# Patient Record
Sex: MALE | Race: WHITE | Employment: FULL TIME | URBAN - METROPOLITAN AREA
[De-identification: names, ages, dates, MRNs, and addresses within clinical notes are randomized per-mention and may not be internally consistent; named-entity substitution may affect disease eponyms.]

---

## 2018-05-26 ENCOUNTER — APPOINTMENT (OUTPATIENT)
Dept: CT IMAGING | Age: 38
End: 2018-05-26
Attending: EMERGENCY MEDICINE
Payer: COMMERCIAL

## 2018-05-26 ENCOUNTER — APPOINTMENT (OUTPATIENT)
Dept: GENERAL RADIOLOGY | Age: 38
End: 2018-05-26
Attending: EMERGENCY MEDICINE
Payer: COMMERCIAL

## 2018-05-26 ENCOUNTER — HOSPITAL ENCOUNTER (EMERGENCY)
Age: 38
Discharge: HOME | End: 2018-05-26
Attending: EMERGENCY MEDICINE
Payer: COMMERCIAL

## 2018-05-26 VITALS
HEIGHT: 74 IN | BODY MASS INDEX: 38.63 KG/M2 | TEMPERATURE: 98 F | RESPIRATION RATE: 18 BRPM | WEIGHT: 301 LBS | DIASTOLIC BLOOD PRESSURE: 78 MMHG | HEART RATE: 76 BPM | OXYGEN SATURATION: 94 % | SYSTOLIC BLOOD PRESSURE: 130 MMHG

## 2018-05-26 DIAGNOSIS — S20.212A CONTUSION OF RIB ON LEFT SIDE, INITIAL ENCOUNTER: Primary | ICD-10-CM

## 2018-05-26 PROCEDURE — 96376 TX/PRO/DX INJ SAME DRUG ADON: CPT

## 2018-05-26 PROCEDURE — 71101 X-RAY EXAM UNILAT RIBS/CHEST: CPT | Performed by: EMERGENCY MEDICINE

## 2018-05-26 PROCEDURE — 96374 THER/PROPH/DIAG INJ IV PUSH: CPT

## 2018-05-26 PROCEDURE — 85025 COMPLETE CBC W/AUTO DIFF WBC: CPT | Performed by: EMERGENCY MEDICINE

## 2018-05-26 PROCEDURE — 74177 CT ABD & PELVIS W/CONTRAST: CPT | Performed by: EMERGENCY MEDICINE

## 2018-05-26 PROCEDURE — 96375 TX/PRO/DX INJ NEW DRUG ADDON: CPT

## 2018-05-26 PROCEDURE — 99284 EMERGENCY DEPT VISIT MOD MDM: CPT

## 2018-05-26 RX ORDER — HYDROMORPHONE HYDROCHLORIDE 1 MG/ML
1 INJECTION, SOLUTION INTRAMUSCULAR; INTRAVENOUS; SUBCUTANEOUS EVERY 30 MIN PRN
Status: DISCONTINUED | OUTPATIENT
Start: 2018-05-26 | End: 2018-05-26

## 2018-05-26 RX ORDER — KETOROLAC TROMETHAMINE 30 MG/ML
15 INJECTION, SOLUTION INTRAMUSCULAR; INTRAVENOUS ONCE
Status: COMPLETED | OUTPATIENT
Start: 2018-05-26 | End: 2018-05-26

## 2018-05-26 RX ORDER — ONDANSETRON 2 MG/ML
4 INJECTION INTRAMUSCULAR; INTRAVENOUS ONCE
Status: COMPLETED | OUTPATIENT
Start: 2018-05-26 | End: 2018-05-26

## 2018-05-26 RX ORDER — HYDROCODONE BITARTRATE AND ACETAMINOPHEN 5; 325 MG/1; MG/1
1-2 TABLET ORAL EVERY 6 HOURS PRN
Qty: 10 TABLET | Refills: 0 | Status: SHIPPED | OUTPATIENT
Start: 2018-05-26 | End: 2018-05-29

## 2018-05-26 NOTE — ED PROVIDER NOTES
Patient Seen in: THE [facility] Emergency Department In [place]    History   Patient presents with:  Trauma (cardiovascular, musculoskeletal)    Stated Complaint: LEFT RIB CAGE PAIN- FELL    HPI    Patient is a 27-year-old male comes emergency room for evalua intact, sclerae white, conjunctiva is pink.   Oropharynx is unremarkable, no exudate, dentition intact, no facial bone tenderness or septal hematomas, TMs clear bilaterally  NECK: Supple, trachea midline, no lymphadenopathy, full ROM cervical spine without COMPARISON:  [place], XR CHEST PA + LAT CHEST (CPT=71020), 1/03/2016, 2:40. INDICATIONS:  LEFT RIB CAGE PAIN- FELL  PATIENT STATED HISTORY: (As transcribed by Technologist)  Patient was working out today when he was picked up and he fell to the ground. ABDOMINAL WALL:  Unremarkable. PELVIC ORGANS:  Unremarkable urinary bladder and prostate gland. LYMPH NODES:  No lymphadenopathy in the abdomen or pelvis. BONES:  Degenerative changes in the spine.   Spinal fusion hardware along the right8 side of the lower diagnosis)    Disposition:  Discharge  5/26/2018  1:00 pm    Follow-up:  Ynes White DO  [address] [address] [phone]      As needed        Medications Prescribed:  Discharge Medication List as of 5/26/2018  1:08 PM    STA

## 2018-12-30 ENCOUNTER — OFFICE VISIT (OUTPATIENT)
Dept: FAMILY MEDICINE CLINIC | Facility: CLINIC | Age: 38
End: 2018-12-30
Payer: COMMERCIAL

## 2018-12-30 VITALS
DIASTOLIC BLOOD PRESSURE: 92 MMHG | SYSTOLIC BLOOD PRESSURE: 132 MMHG | HEART RATE: 72 BPM | TEMPERATURE: 98 F | BODY MASS INDEX: 40.43 KG/M2 | HEIGHT: 74 IN | RESPIRATION RATE: 16 BRPM | WEIGHT: 315 LBS | OXYGEN SATURATION: 97 %

## 2018-12-30 DIAGNOSIS — H69.83 ETD (EUSTACHIAN TUBE DYSFUNCTION), BILATERAL: ICD-10-CM

## 2018-12-30 DIAGNOSIS — J06.9 VIRAL URI: Primary | ICD-10-CM

## 2018-12-30 PROCEDURE — 99213 OFFICE O/P EST LOW 20 MIN: CPT | Performed by: PHYSICIAN ASSISTANT

## 2018-12-30 RX ORDER — AMOXICILLIN 875 MG/1
875 TABLET, COATED ORAL 2 TIMES DAILY
Qty: 20 TABLET | Refills: 0 | Status: SHIPPED | OUTPATIENT
Start: 2018-12-30 | End: 2019-01-09

## 2018-12-30 RX ORDER — CITALOPRAM 20 MG/1
TABLET ORAL
Refills: 2 | COMMUNITY
Start: 2018-12-26 | End: 2020-09-25

## 2018-12-30 RX ORDER — FLUTICASONE PROPIONATE 50 MCG
2 SPRAY, SUSPENSION (ML) NASAL DAILY
Qty: 1 INHALER | Refills: 0 | Status: SHIPPED | OUTPATIENT
Start: 2018-12-30 | End: 2020-09-25

## 2018-12-30 RX ORDER — LAMOTRIGINE 100 MG/1
TABLET, EXTENDED RELEASE ORAL
Refills: 2 | COMMUNITY
Start: 2018-12-26 | End: 2021-01-25

## 2018-12-30 NOTE — PROGRESS NOTES
CHIEF COMPLAINT:   Patient presents with:  Ear Problem: both ear pain, jaw, sinus congestion, post nasal drip x3days      HPI:   Ygnacio Dubin is a 41year old male who presents for URI sxs for  3 days.   Patient reports nasal congestion, maxillary sinu LUNGS: denies shortness of breath or wheezing, See HPI  CARDIOVASCULAR: denies chest pain or palpitations   GI: denies N/V/C or abdominal pain  NEURO: + headaches    EXAM:   BP (!) 132/92 (BP Location: Right arm, Patient Position: Sitting, Cuff Size: large Meds & Refills for this Visit:  Requested Prescriptions     Signed Prescriptions Disp Refills   • amoxicillin 875 MG Oral Tab 20 tablet 0     Sig: Take 1 tablet (875 mg total) by mouth 2 (two) times daily for 10 days.    • Fluticasone Propionate 50 MCG/AC Because the middle ear fluid can become infected, it is important to watch for signs of an ear infection which may develop later. These signs include increased ear pain, fever, or drainage from the ear.   Home care  The following guidelines will help you ca The patient is asked to return if sx's persist or worsen.

## 2018-12-30 NOTE — PATIENT INSTRUCTIONS
-Sudafed  -Flonase    -Push fluids  -May start antibiotic in 2-3 days if symptoms are not improving. Earache, No Infection (Adult)  Earaches can happen without an infection.  This occurs when air and fluid build up behind the eardrum causing a feeling these medicines. Aspirin should never be used in anyone under 25years of age who is ill with a fever. It may cause severe liver damage. · You may use over-the-counter decongestants such as phenylephrine or pseudoephedrine.  But they are not always helpful

## 2019-02-24 ENCOUNTER — HOSPITAL ENCOUNTER (OUTPATIENT)
Age: 39
Discharge: HOME | End: 2019-02-24
Payer: COMMERCIAL

## 2019-02-24 VITALS
RESPIRATION RATE: 16 BRPM | BODY MASS INDEX: 40.43 KG/M2 | WEIGHT: 315 LBS | SYSTOLIC BLOOD PRESSURE: 134 MMHG | OXYGEN SATURATION: 96 % | HEIGHT: 74 IN | TEMPERATURE: 98 F | DIASTOLIC BLOOD PRESSURE: 90 MMHG | HEART RATE: 79 BPM

## 2019-02-24 DIAGNOSIS — J06.9 UPPER RESPIRATORY TRACT INFECTION, UNSPECIFIED TYPE: Primary | ICD-10-CM

## 2019-02-24 PROCEDURE — 99214 OFFICE O/P EST MOD 30 MIN: CPT

## 2019-02-24 PROCEDURE — 99213 OFFICE O/P EST LOW 20 MIN: CPT

## 2019-02-24 RX ORDER — PREDNISONE 20 MG/1
40 TABLET ORAL DAILY
Qty: 10 TABLET | Refills: 0 | Status: SHIPPED | OUTPATIENT
Start: 2019-02-24 | End: 2019-03-01

## 2019-02-24 RX ORDER — BENZONATATE 200 MG/1
200 CAPSULE ORAL 3 TIMES DAILY PRN
Qty: 15 CAPSULE | Refills: 0 | Status: SHIPPED | OUTPATIENT
Start: 2019-02-24 | End: 2019-03-01

## 2019-02-24 NOTE — ED INITIAL ASSESSMENT (HPI)
Cold and fever started 10 days ago. Fever for 4 days, but has resolved. Continued sinus congestion and worsening cold symptoms. Difficulty sleeping with cough that worsening. Cough is productive.

## 2019-02-24 NOTE — ED PROVIDER NOTES
[place] [address] [facility] [place]    History   Patient presents with:  Cough/URI  Fever (infectious)    Stated Complaint: cold-like symptoms    HPI    CHIEF COMPLAINT: Cough, congestion, runny nose    HISTORY OF PRESENT ILLNESS: Patient is HPI.  Constitutional and vital signs reviewed. All other systems reviewed and negative except as noted above.     Physical Exam     ED Triage Vitals [02/24/19 1110]   /90   Pulse 79   Resp 16   Temp 97.9 °F (36.6 °C)   Temp src Temporal   SpO2 96 complaints, or concerns. Patient states they understand diagnosis, followup plan and agree with and understand  discharge instructions and plan. I answered all of the patient's questions prior to discharge. Patient felt comfortable going home.       Dispo

## 2019-11-13 NOTE — ED AVS SNAPSHOT
David Chilel   MRN: [id number]    Department:  THE [facility] Emergency Department in [place]   Date of Visit:  5/26/2018           Disclosure     Insurance plans vary and the physician(s) referred by the ER may not be covered by your plan.  Please cont tell this physician (or your personal doctor if your instructions are to return to your personal doctor) about any new or lasting problems. The primary care or specialist physician will see patients referred from the [facility] Emergency Department.  Sarah Monroy Previous Accession (Optional): [id number] Previous Accession (Optional): [id number]

## 2020-01-06 ENCOUNTER — HOSPITAL ENCOUNTER (EMERGENCY)
Age: 40
Discharge: HOME | End: 2020-01-06
Attending: EMERGENCY MEDICINE
Payer: COMMERCIAL

## 2020-01-06 ENCOUNTER — APPOINTMENT (OUTPATIENT)
Dept: GENERAL RADIOLOGY | Age: 40
End: 2020-01-06
Attending: EMERGENCY MEDICINE
Payer: COMMERCIAL

## 2020-01-06 VITALS
TEMPERATURE: 98 F | SYSTOLIC BLOOD PRESSURE: 119 MMHG | OXYGEN SATURATION: 98 % | WEIGHT: 315 LBS | HEART RATE: 68 BPM | DIASTOLIC BLOOD PRESSURE: 74 MMHG | HEIGHT: 74 IN | BODY MASS INDEX: 40.43 KG/M2 | RESPIRATION RATE: 21 BRPM

## 2020-01-06 DIAGNOSIS — R07.89 CHEST PAIN, ATYPICAL: Primary | ICD-10-CM

## 2020-01-06 LAB
ALBUMIN SERPL-MCNC: 3.7 G/DL (ref 3.4–5)
ALBUMIN/GLOB SERPL: 0.9 {RATIO} (ref 1–2)
ALP LIVER SERPL-CCNC: 114 U/L (ref 45–117)
ALT SERPL-CCNC: 60 U/L (ref 16–61)
ANION GAP SERPL CALC-SCNC: 5 MMOL/L (ref 0–18)
APTT PPP: 28.9 SECONDS (ref 25.4–36.1)
AST SERPL-CCNC: 25 U/L (ref 15–37)
BASOPHILS # BLD AUTO: 0.03 X10(3) UL (ref 0–0.2)
BASOPHILS NFR BLD AUTO: 0.5 %
BILIRUB SERPL-MCNC: 0.4 MG/DL (ref 0.1–2)
BUN BLD-MCNC: 18 MG/DL (ref 7–18)
BUN/CREAT SERPL: 16.5 (ref 10–20)
CALCIUM BLD-MCNC: 8.7 MG/DL (ref 8.5–10.1)
CHLORIDE SERPL-SCNC: 105 MMOL/L (ref 98–112)
CO2 SERPL-SCNC: 29 MMOL/L (ref 21–32)
CREAT BLD-MCNC: 1.09 MG/DL (ref 0.7–1.3)
D-DIMER: 0.28 UG/ML FEU (ref ?–0.5)
DEPRECATED RDW RBC AUTO: 39.7 FL (ref 35.1–46.3)
EOSINOPHIL # BLD AUTO: 0.12 X10(3) UL (ref 0–0.7)
EOSINOPHIL NFR BLD AUTO: 2 %
ERYTHROCYTE [DISTWIDTH] IN BLOOD BY AUTOMATED COUNT: 13 % (ref 11–15)
GLOBULIN PLAS-MCNC: 3.9 G/DL (ref 2.8–4.4)
GLUCOSE BLD-MCNC: 108 MG/DL (ref 70–99)
HCT VFR BLD AUTO: 43.9 % (ref 39–53)
HGB BLD-MCNC: 14.8 G/DL (ref 13–17.5)
IMM GRANULOCYTES # BLD AUTO: 0.03 X10(3) UL (ref 0–1)
IMM GRANULOCYTES NFR BLD: 0.5 %
INR BLD: 0.97 (ref 0.9–1.1)
LYMPHOCYTES # BLD AUTO: 2.51 X10(3) UL (ref 1–4)
LYMPHOCYTES NFR BLD AUTO: 40.9 %
M PROTEIN MFR SERPL ELPH: 7.6 G/DL (ref 6.4–8.2)
MCH RBC QN AUTO: 28.4 PG (ref 26–34)
MCHC RBC AUTO-ENTMCNC: 33.7 G/DL (ref 31–37)
MCV RBC AUTO: 84.3 FL (ref 80–100)
MONOCYTES # BLD AUTO: 0.69 X10(3) UL (ref 0.1–1)
MONOCYTES NFR BLD AUTO: 11.2 %
NEUTROPHILS # BLD AUTO: 2.76 X10 (3) UL (ref 1.5–7.7)
NEUTROPHILS # BLD AUTO: 2.76 X10(3) UL (ref 1.5–7.7)
NEUTROPHILS NFR BLD AUTO: 44.9 %
OSMOLALITY SERPL CALC.SUM OF ELEC: 290 MOSM/KG (ref 275–295)
PLATELET # BLD AUTO: 239 10(3)UL (ref 150–450)
POTASSIUM SERPL-SCNC: 4.3 MMOL/L (ref 3.5–5.1)
PSA SERPL DL<=0.01 NG/ML-MCNC: 12.9 SECONDS (ref 12.2–14.4)
RBC # BLD AUTO: 5.21 X10(6)UL (ref 4.3–5.7)
SODIUM SERPL-SCNC: 139 MMOL/L (ref 136–145)
TROPONIN I SERPL-MCNC: <0.045 NG/ML (ref ?–0.04)
TROPONIN I SERPL-MCNC: <0.045 NG/ML (ref ?–0.04)
WBC # BLD AUTO: 6.1 X10(3) UL (ref 4–11)

## 2020-01-06 PROCEDURE — 85730 THROMBOPLASTIN TIME PARTIAL: CPT | Performed by: EMERGENCY MEDICINE

## 2020-01-06 PROCEDURE — 85610 PROTHROMBIN TIME: CPT | Performed by: EMERGENCY MEDICINE

## 2020-01-06 PROCEDURE — 93010 ELECTROCARDIOGRAM REPORT: CPT

## 2020-01-06 PROCEDURE — 84484 ASSAY OF TROPONIN QUANT: CPT | Performed by: EMERGENCY MEDICINE

## 2020-01-06 PROCEDURE — 71045 X-RAY EXAM CHEST 1 VIEW: CPT | Performed by: EMERGENCY MEDICINE

## 2020-01-06 PROCEDURE — 85025 COMPLETE CBC W/AUTO DIFF WBC: CPT | Performed by: EMERGENCY MEDICINE

## 2020-01-06 PROCEDURE — 36415 COLL VENOUS BLD VENIPUNCTURE: CPT

## 2020-01-06 PROCEDURE — 93005 ELECTROCARDIOGRAM TRACING: CPT

## 2020-01-06 PROCEDURE — 85379 FIBRIN DEGRADATION QUANT: CPT | Performed by: EMERGENCY MEDICINE

## 2020-01-06 PROCEDURE — 99284 EMERGENCY DEPT VISIT MOD MDM: CPT

## 2020-01-06 PROCEDURE — 99285 EMERGENCY DEPT VISIT HI MDM: CPT

## 2020-01-06 PROCEDURE — 80053 COMPREHEN METABOLIC PANEL: CPT | Performed by: EMERGENCY MEDICINE

## 2020-01-07 LAB
ATRIAL RATE: 71 BPM
P AXIS: 33 DEGREES
P-R INTERVAL: 182 MS
Q-T INTERVAL: 410 MS
QRS DURATION: 100 MS
QTC CALCULATION (BEZET): 445 MS
R AXIS: 10 DEGREES
T AXIS: 39 DEGREES
VENTRICULAR RATE: 71 BPM

## 2020-01-07 NOTE — ED INITIAL ASSESSMENT (HPI)
Pt presents to ER complaining of \"feeling off. \"  Pt reports sudden onset of dizziness, SOB and chest pain radiating into his jaw.

## 2020-01-07 NOTE — ED PROVIDER NOTES
Patient Seen in: THE [facility] Emergency Department In [place]      History   Patient presents with:  Chest Pain Angina    Stated Complaint: chest pain, dizziness, starting 30 min ago     HPI    The 32-year-old man was at home with his daughter, he was helpi rare    Drug use: No             Review of Systems    Positive for stated complaint: chest pain, dizziness, starting 30 min ago   Other systems are as noted in HPI. Constitutional and vital signs reviewed.       All other systems reviewed and negative exce Status                     ---------                               -----------         ------                     CBC W/ DIFFERENTIAL[[id number]]                              Final result                 Please view results for these tests on the individual he describes in fairly minute detail that it was thick and slime-like. He however has no signs of mucous plugging on his chest x-ray.   D-dimer is negative for acute clot, and while that does not completely rule out dissection, that with a negative d-dimer

## 2020-01-07 NOTE — ED INITIAL ASSESSMENT (HPI)
Right sided chest pain starting about 30 min ago - started sharp, then changed to heaviness.  Also c/o mid jaw pain

## 2020-09-08 ENCOUNTER — OFFICE VISIT (OUTPATIENT)
Dept: SURGERY | Facility: CLINIC | Age: 40
End: 2020-09-08
Payer: COMMERCIAL

## 2020-09-08 VITALS — DIASTOLIC BLOOD PRESSURE: 86 MMHG | HEART RATE: 70 BPM | TEMPERATURE: 98 F | SYSTOLIC BLOOD PRESSURE: 130 MMHG

## 2020-09-08 DIAGNOSIS — K64.8 INTERNAL HEMORRHOIDS WITH COMPLICATION: ICD-10-CM

## 2020-09-08 DIAGNOSIS — K62.5 RECTAL BLEEDING: Primary | ICD-10-CM

## 2020-09-08 PROCEDURE — 46600 DIAGNOSTIC ANOSCOPY SPX: CPT | Performed by: SURGERY

## 2020-09-08 PROCEDURE — 3079F DIAST BP 80-89 MM HG: CPT | Performed by: SURGERY

## 2020-09-08 PROCEDURE — 3075F SYST BP GE 130 - 139MM HG: CPT | Performed by: SURGERY

## 2020-09-08 PROCEDURE — 99243 OFF/OP CNSLTJ NEW/EST LOW 30: CPT | Performed by: SURGERY

## 2020-09-08 NOTE — H&P
New Patient Visit Note       Active Problems      1. Rectal bleeding    2.  Internal hemorrhoids with complication        Chief Complaint   Patient presents with:  Rectal Pain: pt c/o of rectal pain   Bleeding: pt c/o of rectal bleeding with bm, blood in to today.     Family History   Problem Relation Age of Onset   • Diabetes Paternal Grandfather      Social History    Socioeconomic History      Marital status:       Spouse name: Not on file      Number of children: Not on file      Years of education: swelling. Gastrointestinal: Positive for rectal pain. Negative for abdominal distention, abdominal pain, anal bleeding, blood in stool, constipation, diarrhea, nausea and vomiting.    Genitourinary: Negative for difficulty urinating, dysuria, frequency an normal. Judgment and thought content normal. Cognition and memory are normal.   Nursing note and vitals reviewed.           Assessment   Rectal bleeding  (primary encounter diagnosis)  Internal hemorrhoids with complication      Plan     · Patient has inter

## 2020-09-08 NOTE — PATIENT INSTRUCTIONS
Understanding Hemorrhoids  Hemorrhoid tissues are “cushions” of blood vessels that swell slightly during bowel movements. Too much pressure on the anal canal can make these tissues stay enlarged. They may become inflamed and cause symptoms.  This can happ · Internal hemorrhoids often happen in clusters around the wall of the anal canal. They are often painless. But they may prolapse (protrude out of the anus) due to straining or pressure from hard stool.  After the bowel movement is over, they may then reduc Hemorrhoid tissues are “cushions” of blood vessels that swell slightly during bowel movements. Too much pressure on the anal canal can make these tissues stay enlarged. They may become inflamed and cause symptoms.  This can happen both inside and outside th

## 2020-09-11 ENCOUNTER — OFFICE VISIT (OUTPATIENT)
Dept: SURGERY | Facility: CLINIC | Age: 40
End: 2020-09-11
Payer: COMMERCIAL

## 2020-09-11 VITALS
RESPIRATION RATE: 16 BRPM | WEIGHT: 315 LBS | BODY MASS INDEX: 41 KG/M2 | SYSTOLIC BLOOD PRESSURE: 129 MMHG | DIASTOLIC BLOOD PRESSURE: 92 MMHG | TEMPERATURE: 97 F | HEART RATE: 81 BPM

## 2020-09-11 DIAGNOSIS — K64.8 INTERNAL HEMORRHOIDS WITH COMPLICATION: Primary | ICD-10-CM

## 2020-09-11 PROCEDURE — 3074F SYST BP LT 130 MM HG: CPT | Performed by: SURGERY

## 2020-09-11 PROCEDURE — 3080F DIAST BP >= 90 MM HG: CPT | Performed by: SURGERY

## 2020-09-11 PROCEDURE — 46221 LIGATION OF HEMORRHOID(S): CPT | Performed by: SURGERY

## 2020-09-11 NOTE — PROGRESS NOTES
Follow Up Visit Note       Active Problems      1.  Internal hemorrhoids with complication          Chief Complaint   Patient presents with:  Hemorrhoid Bandinst banding        History of Present Illness    The patient presents for rubber band ligation 50 MCG/ACT Nasal Suspension 2 sprays by Each Nare route daily. 1 Inhaler 0   • Zolpidem Tartrate 10 MG Oral Tab Take 10 mg by mouth nightly as needed for Sleep.      • Amphetamine-Dextroamphet ER (ADDERALL XR) 20 MG Oral Capsule SR 24 Hr Take 20 mg by mouth Assessment   Internal hemorrhoids with complication  (primary encounter diagnosis)    Plan     · Successful treatment of internal hemorrhoid of the left lateral position by rubber band ligation and phenol injection.   ·   · The care plan is discussed

## 2020-09-11 NOTE — PROGRESS NOTES
Follow Up Visit Note       Active Problems      No diagnosis found. Chief Complaint   Patient presents with:  Hemorrhoid Bandinst banding        History of Present Illness        Allergies  Jj Sorenson has No Known Allergies.     Past Medical / Bubba Chirinos nightly as needed for Sleep., Disp: , Rfl:   •  Amphetamine-Dextroamphet ER (ADDERALL XR) 20 MG Oral Capsule SR 24 Hr, Take 20 mg by mouth every morning., Disp: , Rfl:   •  Propranolol HCl (INDERAL) 80 MG Oral Tab, Take 1 tablet by mouth 2 (two) times kevin

## 2020-09-25 ENCOUNTER — OFFICE VISIT (OUTPATIENT)
Dept: SURGERY | Facility: CLINIC | Age: 40
End: 2020-09-25
Payer: COMMERCIAL

## 2020-09-25 VITALS
SYSTOLIC BLOOD PRESSURE: 128 MMHG | HEART RATE: 76 BPM | WEIGHT: 315 LBS | BODY MASS INDEX: 40.43 KG/M2 | DIASTOLIC BLOOD PRESSURE: 94 MMHG | TEMPERATURE: 98 F | HEIGHT: 74 IN

## 2020-09-25 DIAGNOSIS — K64.8 INTERNAL HEMORRHOIDS WITH COMPLICATION: Primary | ICD-10-CM

## 2020-09-25 PROCEDURE — 3008F BODY MASS INDEX DOCD: CPT | Performed by: SURGERY

## 2020-09-25 PROCEDURE — 3080F DIAST BP >= 90 MM HG: CPT | Performed by: SURGERY

## 2020-09-25 PROCEDURE — 46221 LIGATION OF HEMORRHOID(S): CPT | Performed by: SURGERY

## 2020-09-25 PROCEDURE — 3074F SYST BP LT 130 MM HG: CPT | Performed by: SURGERY

## 2020-09-25 RX ORDER — ARIPIPRAZOLE 5 MG/1
5 TABLET ORAL DAILY
COMMUNITY
Start: 2020-09-16 | End: 2021-01-25

## 2020-09-25 RX ORDER — CLONAZEPAM 1 MG/1
1 TABLET ORAL NIGHTLY
Status: ON HOLD | COMMUNITY
Start: 2020-09-10 | End: 2021-01-26

## 2020-09-25 NOTE — PROGRESS NOTES
Follow Up Visit Note       Active Problems      No diagnosis found. Chief Complaint   Patient presents with:  Hemorrhoid Banding: Patient is here for 2nd hemorrhoid banding.         History of Present Illness        Allergies  Millie Luis has No Known Isai MG Oral Tab, Take 10 mg by mouth nightly as needed for Sleep., Disp: , Rfl:   •  Amphetamine-Dextroamphet ER (ADDERALL XR) 20 MG Oral Capsule SR 24 Hr, Take 20 mg by mouth every morning., Disp: , Rfl:   •  Propranolol HCl (INDERAL) 80 MG Oral Tab, Take 1 t

## 2020-09-28 NOTE — PROGRESS NOTES
Follow Up Visit Note       Active Problems      1.  Internal hemorrhoids with complication          Chief Complaint   Patient presents with:  Hemorrhoid Banding: Patient is here for 2nd hemorrhoid banding. c/o bleeding for 3 days after 1st bandind and pain LamoTRIgine  MG Oral Tablet 24 Hr TK 3 TS PO D  2   • Amphetamine-Dextroamphet ER (ADDERALL XR) 20 MG Oral Capsule SR 24 Hr Take 20 mg by mouth every morning. • Propranolol HCl (INDERAL) 80 MG Oral Tab Take 1 tablet by mouth 2 (two) times daily. · Successful treatment of internal hemorrhoid by rubber band ligation and phenol injection. ·   · The care plan is discussed with the patient who voiced understanding.   ·   · Dietary, activity, and exercise recommendations were discussed with the mariam

## 2020-10-09 ENCOUNTER — OFFICE VISIT (OUTPATIENT)
Dept: SURGERY | Facility: CLINIC | Age: 40
End: 2020-10-09
Payer: COMMERCIAL

## 2020-10-09 VITALS — TEMPERATURE: 97 F | HEART RATE: 87 BPM | SYSTOLIC BLOOD PRESSURE: 125 MMHG | DIASTOLIC BLOOD PRESSURE: 86 MMHG

## 2020-10-09 DIAGNOSIS — K64.8 INTERNAL HEMORRHOIDS WITH COMPLICATION: Primary | ICD-10-CM

## 2020-10-09 PROCEDURE — 3074F SYST BP LT 130 MM HG: CPT | Performed by: SURGERY

## 2020-10-09 PROCEDURE — 46221 LIGATION OF HEMORRHOID(S): CPT | Performed by: SURGERY

## 2020-10-09 PROCEDURE — 3079F DIAST BP 80-89 MM HG: CPT | Performed by: SURGERY

## 2020-10-09 NOTE — PROGRESS NOTES
Follow Up Visit Note       Active Problems      No diagnosis found. Chief Complaint   Patient presents with:  Hemorrhoid Banding: 3rd banding.  2nd went better than first, had bleeding throughout the day, c/o improved bleeding from bandings, has increa 20 mg by mouth every morning., Disp: , Rfl:   •  Propranolol HCl (INDERAL) 80 MG Oral Tab, Take 1 tablet by mouth 2 (two) times daily.  (Patient taking differently: Take 120 mg by mouth daily.  ), Disp: 60 tablet, Rfl: 2     Review of Systems  The Review of

## 2020-10-09 NOTE — PROGRESS NOTES
Follow Up Visit Note       Active Problems      1. Internal hemorrhoids with complication          Chief Complaint   Patient presents with:  Hemorrhoid Banding: 3rd banding.  2nd went better than first, had bleeding throughout the day, c/o improved bleeding nightly. • LamoTRIgine  MG Oral Tablet 24 Hr TK 3 TS PO D  2   • Amphetamine-Dextroamphet ER (ADDERALL XR) 20 MG Oral Capsule SR 24 Hr Take 20 mg by mouth every morning.      • Propranolol HCl (INDERAL) 80 MG Oral Tab Take 1 tablet by mouth 2 (two ligation and phenol injection. ·   · The care plan is discussed with the patient who voiced understanding. ·   · Dietary, activity, and exercise recommendations were discussed with the patient.   ·   · The patient is encouraged to avoid straining, continu

## 2020-10-30 ENCOUNTER — OFFICE VISIT (OUTPATIENT)
Dept: SURGERY | Facility: CLINIC | Age: 40
End: 2020-10-30
Payer: COMMERCIAL

## 2020-10-30 VITALS
RESPIRATION RATE: 16 BRPM | HEART RATE: 80 BPM | WEIGHT: 315 LBS | DIASTOLIC BLOOD PRESSURE: 88 MMHG | SYSTOLIC BLOOD PRESSURE: 132 MMHG | BODY MASS INDEX: 41 KG/M2 | TEMPERATURE: 97 F

## 2020-10-30 DIAGNOSIS — K64.8 INTERNAL HEMORRHOIDS WITH COMPLICATION: Primary | ICD-10-CM

## 2020-10-30 PROCEDURE — 3079F DIAST BP 80-89 MM HG: CPT | Performed by: SURGERY

## 2020-10-30 PROCEDURE — 99212 OFFICE O/P EST SF 10 MIN: CPT | Performed by: SURGERY

## 2020-10-30 PROCEDURE — 46600 DIAGNOSTIC ANOSCOPY SPX: CPT | Performed by: SURGERY

## 2020-10-30 PROCEDURE — 3075F SYST BP GE 130 - 139MM HG: CPT | Performed by: SURGERY

## 2020-10-30 RX ORDER — PROPRANOLOL HYDROCHLORIDE 80 MG/1
80 CAPSULE, EXTENDED RELEASE ORAL DAILY
COMMUNITY
Start: 2020-10-18 | End: 2022-01-05

## 2020-10-30 NOTE — PROGRESS NOTES
Follow Up Visit Note       Active Problems      1.  Internal hemorrhoids with complication          Chief Complaint   Patient presents with:  Hemorrhoid Bandinth banding        History of Present Illness    The patient returns for follow-up after comple social 3x per week, now more rare      Drug use: No    Other Topics      Concerns:        Caffeine Concern: No        Exercise: No       Current Outpatient Medications:   •  Propranolol HCl ER 80 MG Oral Capsule SR 24 Hr, , Disp: , Rfl:   •  ARIpiprazole 5 Genitourinary: Rectum:      No rectal mass, anal fissure, tenderness, external hemorrhoid, internal hemorrhoid or abnormal anal tone. Prostate is not enlarged and not tender.     Genitourinary Comments: No Prostate Nodule  Anal Sphincter Intact  No Prur

## 2021-01-18 ENCOUNTER — APPOINTMENT (OUTPATIENT)
Dept: GENERAL RADIOLOGY | Age: 41
End: 2021-01-18
Attending: NURSE PRACTITIONER
Payer: COMMERCIAL

## 2021-01-18 ENCOUNTER — HOSPITAL ENCOUNTER (OUTPATIENT)
Age: 41
Discharge: HOME | End: 2021-01-18
Attending: NURSE PRACTITIONER
Payer: COMMERCIAL

## 2021-01-18 VITALS
DIASTOLIC BLOOD PRESSURE: 90 MMHG | RESPIRATION RATE: 16 BRPM | WEIGHT: 315 LBS | BODY MASS INDEX: 42 KG/M2 | TEMPERATURE: 98 F | HEART RATE: 73 BPM | SYSTOLIC BLOOD PRESSURE: 131 MMHG | OXYGEN SATURATION: 96 %

## 2021-01-18 DIAGNOSIS — J98.01 BRONCHOSPASM: Primary | ICD-10-CM

## 2021-01-18 PROCEDURE — 71046 X-RAY EXAM CHEST 2 VIEWS: CPT | Performed by: NURSE PRACTITIONER

## 2021-01-18 PROCEDURE — 99213 OFFICE O/P EST LOW 20 MIN: CPT | Performed by: NURSE PRACTITIONER

## 2021-01-18 RX ORDER — BENZONATATE 100 MG/1
100 CAPSULE ORAL 3 TIMES DAILY PRN
Qty: 30 CAPSULE | Refills: 0 | Status: SHIPPED | OUTPATIENT
Start: 2021-01-18 | End: 2021-01-25

## 2021-01-18 RX ORDER — PREDNISONE 20 MG/1
40 TABLET ORAL DAILY
Qty: 10 TABLET | Refills: 0 | Status: SHIPPED | OUTPATIENT
Start: 2021-01-18 | End: 2021-01-23

## 2021-01-18 RX ORDER — ALBUTEROL SULFATE 90 UG/1
2 AEROSOL, METERED RESPIRATORY (INHALATION) EVERY 4 HOURS PRN
Qty: 1 INHALER | Refills: 0 | Status: SHIPPED | OUTPATIENT
Start: 2021-01-18 | End: 2021-02-17

## 2021-01-18 NOTE — ED PROVIDER NOTES
Patient Seen in: [facility] [address] [place] [address]      History   Patient presents with:  Difficulty Breathing  Cough/URI    Stated Complaint: shortness of breath, covid + 12/24    HPI/Subjective:   HPI  Patient is a 45-year-old male past medical history of anxiety 16   Temp 97.9 °F (36.6 °C)   Temp src Temporal   SpO2 96 %   O2 Device None (Room air)       Current:/90   Pulse 73   Temp 97.9 °F (36.6 °C) (Temporal)   Resp 16   Wt (!) 149.7 kg   SpO2 96%   BMI 42.37 kg/m²         Physical Exam  Vitals signs and cough and wheeze until he can catch his breath again. FINDINGS:  Normal heart size and pulmonary vascularity. Clear lungs.   No pleural effusion            CONCLUSION:  No abnormality demonstrated in the chest.    Dictated by (CST): Lilli Meza MD on

## 2021-01-18 NOTE — ED INITIAL ASSESSMENT (HPI)
12/24 Pt was diagnosed with COVID    Pt states he \"feels better\" but c/o OTOOLE, dry cough, upper chest pain    Denies fevers

## 2021-01-22 ENCOUNTER — APPOINTMENT (OUTPATIENT)
Dept: CT IMAGING | Facility: HOSPITAL | Age: 41
End: 2021-01-22
Attending: EMERGENCY MEDICINE
Payer: COMMERCIAL

## 2021-01-22 ENCOUNTER — HOSPITAL ENCOUNTER (EMERGENCY)
Facility: HOSPITAL | Age: 41
Discharge: HOME | End: 2021-01-22
Attending: EMERGENCY MEDICINE
Payer: COMMERCIAL

## 2021-01-22 VITALS
RESPIRATION RATE: 17 BRPM | OXYGEN SATURATION: 97 % | HEART RATE: 73 BPM | DIASTOLIC BLOOD PRESSURE: 99 MMHG | SYSTOLIC BLOOD PRESSURE: 158 MMHG | HEIGHT: 75 IN | WEIGHT: 315 LBS | BODY MASS INDEX: 39.17 KG/M2 | TEMPERATURE: 97 F

## 2021-01-22 DIAGNOSIS — J40 BRONCHITIS: Primary | ICD-10-CM

## 2021-01-22 LAB
ALBUMIN SERPL-MCNC: 3.6 G/DL (ref 3.4–5)
ALBUMIN/GLOB SERPL: 1 {RATIO} (ref 1–2)
ALP LIVER SERPL-CCNC: 109 U/L
ALT SERPL-CCNC: 41 U/L
ANION GAP SERPL CALC-SCNC: 3 MMOL/L (ref 0–18)
AST SERPL-CCNC: 13 U/L (ref 15–37)
BASOPHILS # BLD AUTO: 0.04 X10(3) UL (ref 0–0.2)
BASOPHILS NFR BLD AUTO: 0.6 %
BILIRUB SERPL-MCNC: 0.3 MG/DL (ref 0.1–2)
BUN BLD-MCNC: 20 MG/DL (ref 7–18)
BUN/CREAT SERPL: 21.7 (ref 10–20)
CALCIUM BLD-MCNC: 9 MG/DL (ref 8.5–10.1)
CHLORIDE SERPL-SCNC: 107 MMOL/L (ref 98–112)
CO2 SERPL-SCNC: 28 MMOL/L (ref 21–32)
CREAT BLD-MCNC: 0.92 MG/DL
DEPRECATED RDW RBC AUTO: 40.6 FL (ref 35.1–46.3)
EOSINOPHIL # BLD AUTO: 0.08 X10(3) UL (ref 0–0.7)
EOSINOPHIL NFR BLD AUTO: 1.2 %
ERYTHROCYTE [DISTWIDTH] IN BLOOD BY AUTOMATED COUNT: 13.6 % (ref 11–15)
GLOBULIN PLAS-MCNC: 3.6 G/DL (ref 2.8–4.4)
GLUCOSE BLD-MCNC: 84 MG/DL (ref 70–99)
HCT VFR BLD AUTO: 45.3 %
HGB BLD-MCNC: 15.2 G/DL
IMM GRANULOCYTES # BLD AUTO: 0.05 X10(3) UL (ref 0–1)
IMM GRANULOCYTES NFR BLD: 0.7 %
LYMPHOCYTES # BLD AUTO: 2.09 X10(3) UL (ref 1–4)
LYMPHOCYTES NFR BLD AUTO: 31.3 %
M PROTEIN MFR SERPL ELPH: 7.2 G/DL (ref 6.4–8.2)
MCH RBC QN AUTO: 27.8 PG (ref 26–34)
MCHC RBC AUTO-ENTMCNC: 33.6 G/DL (ref 31–37)
MCV RBC AUTO: 82.8 FL
MONOCYTES # BLD AUTO: 0.5 X10(3) UL (ref 0.1–1)
MONOCYTES NFR BLD AUTO: 7.5 %
NEUTROPHILS # BLD AUTO: 3.92 X10 (3) UL (ref 1.5–7.7)
NEUTROPHILS # BLD AUTO: 3.92 X10(3) UL (ref 1.5–7.7)
NEUTROPHILS NFR BLD AUTO: 58.7 %
OSMOLALITY SERPL CALC.SUM OF ELEC: 288 MOSM/KG (ref 275–295)
PLATELET # BLD AUTO: 230 10(3)UL (ref 150–450)
POTASSIUM SERPL-SCNC: 3.9 MMOL/L (ref 3.5–5.1)
RBC # BLD AUTO: 5.47 X10(6)UL
SODIUM SERPL-SCNC: 138 MMOL/L (ref 136–145)
TROPONIN I SERPL-MCNC: <0.045 NG/ML (ref ?–0.04)
WBC # BLD AUTO: 6.7 X10(3) UL (ref 4–11)

## 2021-01-22 PROCEDURE — 99284 EMERGENCY DEPT VISIT MOD MDM: CPT

## 2021-01-22 PROCEDURE — 99285 EMERGENCY DEPT VISIT HI MDM: CPT

## 2021-01-22 PROCEDURE — 36415 COLL VENOUS BLD VENIPUNCTURE: CPT

## 2021-01-22 PROCEDURE — 93005 ELECTROCARDIOGRAM TRACING: CPT

## 2021-01-22 PROCEDURE — 85025 COMPLETE CBC W/AUTO DIFF WBC: CPT | Performed by: EMERGENCY MEDICINE

## 2021-01-22 PROCEDURE — 80053 COMPREHEN METABOLIC PANEL: CPT | Performed by: EMERGENCY MEDICINE

## 2021-01-22 PROCEDURE — 93010 ELECTROCARDIOGRAM REPORT: CPT

## 2021-01-22 PROCEDURE — 84484 ASSAY OF TROPONIN QUANT: CPT | Performed by: EMERGENCY MEDICINE

## 2021-01-22 PROCEDURE — 71275 CT ANGIOGRAPHY CHEST: CPT | Performed by: EMERGENCY MEDICINE

## 2021-01-22 NOTE — ED INITIAL ASSESSMENT (HPI)
Patient to ED with c/o coughing fits for one month and SOB. Patient had COVID in December. Patient to ED to r/o PE.

## 2021-01-23 LAB
ATRIAL RATE: 73 BPM
P AXIS: 23 DEGREES
P-R INTERVAL: 172 MS
Q-T INTERVAL: 402 MS
QRS DURATION: 92 MS
QTC CALCULATION (BEZET): 442 MS
R AXIS: 8 DEGREES
T AXIS: 22 DEGREES
VENTRICULAR RATE: 73 BPM

## 2021-01-23 NOTE — ED PROVIDER NOTES
Patient Seen in: [facility] Emergency Department      History   Patient presents with:  Difficulty Breathing    Stated Complaint: RICH, COVID + one month ago, 100% on room air    HPI/Subjective:   HPI    This is a very pleasant 28-year-old male who r Temp 97.2 °F (36.2 °C)   Temp src Temporal   SpO2 97 %   O2 Device None (Room air)       Current:BP (!) 158/99   Pulse 73   Temp 97.2 °F (36.2 °C) (Temporal)   Resp 17   Ht 190.5 cm (6' 3\")   Wt (!) 149.7 kg   SpO2 97%   BMI 41.25 kg/m²         Physical INDICATIONS:  shortness of breath, covid + 12/24  COMPARISON:  None. TECHNIQUE:  PA and lateral chest radiographs were obtained. PATIENT STATED HISTORY: (As transcribed by Technologist)  Patient states he was positive with Covid on 12.24/20.  Last week th of the upper abdomen are unremarkable. BONES:  No bony lesion or fracture. CONCLUSION:  No evidence of pulmonary embolism or acute cardiopulmonary process.    Dictated by (CST): Albert Hines MD on 1/22/2021 at 6:17 PM     Finalized by (CST): S

## 2021-01-25 ENCOUNTER — APPOINTMENT (OUTPATIENT)
Dept: GENERAL RADIOLOGY | Facility: HOSPITAL | Age: 41
DRG: 948 | End: 2021-01-25
Attending: EMERGENCY MEDICINE
Payer: COMMERCIAL

## 2021-01-25 ENCOUNTER — APPOINTMENT (OUTPATIENT)
Dept: CT IMAGING | Facility: HOSPITAL | Age: 41
DRG: 948 | End: 2021-01-25
Attending: EMERGENCY MEDICINE
Payer: COMMERCIAL

## 2021-01-25 ENCOUNTER — HOSPITAL ENCOUNTER (INPATIENT)
Facility: HOSPITAL | Age: 41
LOS: 1 days | Discharge: HOME | DRG: 948 | End: 2021-01-26
Attending: EMERGENCY MEDICINE | Admitting: HOSPITALIST
Payer: COMMERCIAL

## 2021-01-25 DIAGNOSIS — G25.79: Primary | ICD-10-CM

## 2021-01-25 PROBLEM — G90.81 SEROTONERGIC SYNDROME: Status: ACTIVE | Noted: 2021-01-25

## 2021-01-25 LAB
ALBUMIN SERPL-MCNC: 3.8 G/DL (ref 3.4–5)
ALBUMIN/GLOB SERPL: 0.9 {RATIO} (ref 1–2)
ALP LIVER SERPL-CCNC: 122 U/L
ALT SERPL-CCNC: 46 U/L
ANION GAP SERPL CALC-SCNC: 6 MMOL/L (ref 0–18)
AST SERPL-CCNC: 28 U/L (ref 15–37)
ATRIAL RATE: 75 BPM
BASOPHILS # BLD AUTO: 0.05 X10(3) UL (ref 0–0.2)
BASOPHILS NFR BLD AUTO: 0.7 %
BILIRUB SERPL-MCNC: 0.4 MG/DL (ref 0.1–2)
BILIRUB UR QL STRIP.AUTO: NEGATIVE
BUN BLD-MCNC: 11 MG/DL (ref 7–18)
BUN/CREAT SERPL: 12.6 (ref 10–20)
CALCIUM BLD-MCNC: 9.4 MG/DL (ref 8.5–10.1)
CHLORIDE SERPL-SCNC: 107 MMOL/L (ref 98–112)
CLARITY UR REFRACT.AUTO: CLEAR
CO2 SERPL-SCNC: 25 MMOL/L (ref 21–32)
COLOR UR AUTO: YELLOW
CREAT BLD-MCNC: 0.87 MG/DL
DEPRECATED RDW RBC AUTO: 40.7 FL (ref 35.1–46.3)
EOSINOPHIL # BLD AUTO: 0.11 X10(3) UL (ref 0–0.7)
EOSINOPHIL NFR BLD AUTO: 1.5 %
ERYTHROCYTE [DISTWIDTH] IN BLOOD BY AUTOMATED COUNT: 13.6 % (ref 11–15)
GLOBULIN PLAS-MCNC: 4.4 G/DL (ref 2.8–4.4)
GLUCOSE BLD-MCNC: 89 MG/DL (ref 70–99)
GLUCOSE UR STRIP.AUTO-MCNC: NEGATIVE MG/DL
HCT VFR BLD AUTO: 48.7 %
HGB BLD-MCNC: 16.5 G/DL
IMM GRANULOCYTES # BLD AUTO: 0.04 X10(3) UL (ref 0–1)
IMM GRANULOCYTES NFR BLD: 0.6 %
KETONES UR STRIP.AUTO-MCNC: NEGATIVE MG/DL
LEUKOCYTE ESTERASE UR QL STRIP.AUTO: NEGATIVE
LYMPHOCYTES # BLD AUTO: 2.31 X10(3) UL (ref 1–4)
LYMPHOCYTES NFR BLD AUTO: 32.4 %
M PROTEIN MFR SERPL ELPH: 8.2 G/DL (ref 6.4–8.2)
MCH RBC QN AUTO: 28.2 PG (ref 26–34)
MCHC RBC AUTO-ENTMCNC: 33.9 G/DL (ref 31–37)
MCV RBC AUTO: 83.2 FL
MONOCYTES # BLD AUTO: 0.67 X10(3) UL (ref 0.1–1)
MONOCYTES NFR BLD AUTO: 9.4 %
NEUTROPHILS # BLD AUTO: 3.95 X10 (3) UL (ref 1.5–7.7)
NEUTROPHILS # BLD AUTO: 3.95 X10(3) UL (ref 1.5–7.7)
NEUTROPHILS NFR BLD AUTO: 55.4 %
NITRITE UR QL STRIP.AUTO: NEGATIVE
OSMOLALITY SERPL CALC.SUM OF ELEC: 285 MOSM/KG (ref 275–295)
P AXIS: 27 DEGREES
P-R INTERVAL: 168 MS
PH UR STRIP.AUTO: 6 [PH] (ref 4.5–8)
PLATELET # BLD AUTO: 261 10(3)UL (ref 150–450)
POTASSIUM SERPL-SCNC: 4.1 MMOL/L (ref 3.5–5.1)
PROT UR STRIP.AUTO-MCNC: NEGATIVE MG/DL
Q-T INTERVAL: 400 MS
QRS DURATION: 94 MS
QTC CALCULATION (BEZET): 446 MS
R AXIS: 1 DEGREES
RBC # BLD AUTO: 5.85 X10(6)UL
RBC UR QL AUTO: NEGATIVE
SARS-COV-2 RNA RESP QL NAA+PROBE: NOT DETECTED
SODIUM SERPL-SCNC: 138 MMOL/L (ref 136–145)
SP GR UR STRIP.AUTO: 1.02 (ref 1–1.03)
T AXIS: 22 DEGREES
UROBILINOGEN UR STRIP.AUTO-MCNC: <2 MG/DL
VENTRICULAR RATE: 75 BPM
WBC # BLD AUTO: 7.1 X10(3) UL (ref 4–11)

## 2021-01-25 PROCEDURE — 71045 X-RAY EXAM CHEST 1 VIEW: CPT | Performed by: EMERGENCY MEDICINE

## 2021-01-25 PROCEDURE — 99223 1ST HOSP IP/OBS HIGH 75: CPT | Performed by: HOSPITALIST

## 2021-01-25 PROCEDURE — 70450 CT HEAD/BRAIN W/O DYE: CPT | Performed by: EMERGENCY MEDICINE

## 2021-01-25 RX ORDER — ENOXAPARIN SODIUM 100 MG/ML
0.5 INJECTION SUBCUTANEOUS NIGHTLY
Status: DISCONTINUED | OUTPATIENT
Start: 2021-01-25 | End: 2021-01-26

## 2021-01-25 RX ORDER — TRAZODONE HYDROCHLORIDE 50 MG/1
50 TABLET ORAL NIGHTLY
COMMUNITY
End: 2021-04-09 | Stop reason: ALTCHOICE

## 2021-01-25 RX ORDER — LAMOTRIGINE 100 MG/1
200 TABLET ORAL 2 TIMES DAILY
Status: DISCONTINUED | OUTPATIENT
Start: 2021-01-26 | End: 2021-01-26

## 2021-01-25 RX ORDER — METHOCARBAMOL 100 MG/ML
1 INJECTION, SOLUTION INTRAMUSCULAR; INTRAVENOUS ONCE
Status: COMPLETED | OUTPATIENT
Start: 2021-01-25 | End: 2021-01-25

## 2021-01-25 RX ORDER — KETOROLAC TROMETHAMINE 30 MG/ML
15 INJECTION, SOLUTION INTRAMUSCULAR; INTRAVENOUS ONCE
Status: COMPLETED | OUTPATIENT
Start: 2021-01-25 | End: 2021-01-25

## 2021-01-25 RX ORDER — LAMOTRIGINE 100 MG/1
100 TABLET, EXTENDED RELEASE ORAL DAILY
COMMUNITY

## 2021-01-25 RX ORDER — ONDANSETRON 2 MG/ML
4 INJECTION INTRAMUSCULAR; INTRAVENOUS ONCE
Status: COMPLETED | OUTPATIENT
Start: 2021-01-25 | End: 2021-01-25

## 2021-01-25 RX ORDER — METOPROLOL TARTRATE 5 MG/5ML
5 INJECTION INTRAVENOUS ONCE
Status: COMPLETED | OUTPATIENT
Start: 2021-01-25 | End: 2021-01-25

## 2021-01-25 RX ORDER — PROPRANOLOL HYDROCHLORIDE 20 MG/1
20 TABLET ORAL
Status: DISCONTINUED | OUTPATIENT
Start: 2021-01-26 | End: 2021-01-26

## 2021-01-25 RX ORDER — ONDANSETRON 2 MG/ML
4 INJECTION INTRAMUSCULAR; INTRAVENOUS EVERY 6 HOURS PRN
Status: DISCONTINUED | OUTPATIENT
Start: 2021-01-25 | End: 2021-01-26

## 2021-01-25 RX ORDER — LAMOTRIGINE 300 MG/1
300 TABLET, EXTENDED RELEASE ORAL DAILY
COMMUNITY
End: 2021-04-09

## 2021-01-25 RX ORDER — LORAZEPAM 2 MG/ML
1 INJECTION INTRAMUSCULAR ONCE
Status: COMPLETED | OUTPATIENT
Start: 2021-01-25 | End: 2021-01-25

## 2021-01-25 RX ORDER — SODIUM CHLORIDE, SODIUM LACTATE, POTASSIUM CHLORIDE, CALCIUM CHLORIDE 600; 310; 30; 20 MG/100ML; MG/100ML; MG/100ML; MG/100ML
INJECTION, SOLUTION INTRAVENOUS CONTINUOUS
Status: DISCONTINUED | OUTPATIENT
Start: 2021-01-25 | End: 2021-01-26

## 2021-01-25 RX ORDER — DESVENLAFAXINE 50 MG/1
50 TABLET, EXTENDED RELEASE ORAL DAILY
Status: ON HOLD | COMMUNITY
End: 2021-01-26

## 2021-01-25 RX ORDER — LORAZEPAM 2 MG/ML
2 INJECTION INTRAMUSCULAR EVERY 6 HOURS
Status: DISCONTINUED | OUTPATIENT
Start: 2021-01-25 | End: 2021-01-26

## 2021-01-25 RX ORDER — ACETAMINOPHEN 325 MG/1
650 TABLET ORAL EVERY 6 HOURS PRN
Status: DISCONTINUED | OUTPATIENT
Start: 2021-01-25 | End: 2021-01-26

## 2021-01-25 NOTE — ED PROVIDER NOTES
Patient Seen in: [facility] Emergency Department      History   Patient presents with:  Nausea/Vomiting/Diarrhea  Headache    Stated Complaint: nvd, headache, med reaction    HPI/Subjective:   HPI    Patient presents with possible medication reactio (Room air)       Current:BP (!) 146/96   Pulse 74   Temp 98.2 °F (36.8 °C) (Temporal)   Resp 14   Ht 185.4 cm (6' 1\")   Wt (!) 148.8 kg   SpO2 95%   BMI 43.27 kg/m²         Physical Exam    General: Alert and oriented x3, appears uncomfortable.   HEENT: No shortness of breath, covid + 12/24  COMPARISON:  None. TECHNIQUE:  PA and lateral chest radiographs were obtained. PATIENT STATED HISTORY: (As transcribed by Technologist)  Patient states he was positive with Covid on 12.24/20.  Last week the shortness of abdomen are unremarkable. BONES:  No bony lesion or fracture. CONCLUSION:  No evidence of pulmonary embolism or acute cardiopulmonary process.    Dictated by (CST): Julia Miller MD on 1/22/2021 at 6:17 PM     Finalized by (CST): Julia Miller, Robaxin. MDM      The patient's symptoms are suggestive of serotonin syndrome. His lab work-up and EKG are reassuring. He will be admitted to Dr. Abril Dubose from the hospitalist service for close observation and further treatment.   Patient was updated

## 2021-01-25 NOTE — H&P
[facility]PITAL[facility]  History and Physical     Reinaldo Lizama Patient Status:  Emergency    1980 MRN [id number]   Location [facility] Attending Shruthi Murphy MD   Hosp Day # 0 PCP Antonio Gipson DO     Chief Compl (four) hours as needed for Wheezing., Disp: 1 Inhaler, Rfl: 0    •  benzonatate 100 MG Oral Cap, Take 1 capsule (100 mg total) by mouth 3 (three) times daily as needed for cough. , Disp: 30 capsule, Rfl: 0    •  Propranolol HCl ER 80 MG Oral Capsule SR 24 H K 3.9 4.1    107   CO2 28.0 25.0   ALKPHO 109 122*   AST 13* 28   ALT 41 46   BILT 0.3 0.4   TP 7.2 8.2       Estimated Creatinine Clearance: 128.8 mL/min (based on SCr of 0.87 mg/dL). No results for input(s): PTP, INR in the last 168 hours.

## 2021-01-26 VITALS
DIASTOLIC BLOOD PRESSURE: 68 MMHG | RESPIRATION RATE: 20 BRPM | OXYGEN SATURATION: 94 % | HEART RATE: 78 BPM | TEMPERATURE: 98 F | BODY MASS INDEX: 39.17 KG/M2 | SYSTOLIC BLOOD PRESSURE: 117 MMHG | HEIGHT: 75 IN | WEIGHT: 315 LBS

## 2021-01-26 PROCEDURE — 99239 HOSP IP/OBS DSCHRG MGMT >30: CPT | Performed by: HOSPITALIST

## 2021-01-26 RX ORDER — TIZANIDINE 2 MG/1
2 TABLET ORAL EVERY 8 HOURS PRN
Qty: 20 TABLET | Refills: 0 | Status: SHIPPED | OUTPATIENT
Start: 2021-01-26 | End: 2021-01-26

## 2021-01-26 RX ORDER — TIZANIDINE 2 MG/1
2 TABLET ORAL EVERY 8 HOURS PRN
Status: DISCONTINUED | OUTPATIENT
Start: 2021-01-26 | End: 2021-01-26

## 2021-01-26 RX ORDER — LORAZEPAM 0.5 MG/1
1 TABLET ORAL EVERY 6 HOURS PRN
Qty: 15 TABLET | Refills: 0 | Status: SHIPPED | OUTPATIENT
Start: 2021-01-26 | End: 2021-04-16

## 2021-01-26 NOTE — PROGRESS NOTES
[facility]  Progress Note     Braulio Chau Patient Status:  Inpatient    1980 MRN [id number]   [facility] 3NE-A Attending Nuno Ríos MD   Hosp Day # 1 PCP Elia Valdez DO     Chief Complaint: N/v    S: Patient feel reviewed in Epic. Medications:   • Propranolol HCl  20 mg Oral QID Beta Blocker   • lamoTRIgine  200 mg Oral BID   • enoxaparin  0.5 mg/kg Subcutaneous Nightly   • LORazepam  2 mg Intravenous Q6H       ASSESSMENT / PLAN:     1.  Likely serotonin syndrome

## 2021-01-26 NOTE — PLAN OF CARE
Alert and oriented,V/S stable,IVF infusing,voids without problem. Plan of care discussed with patient,as well as safety precautions,verbalizes understanding. Call light kept within reach,isolation for r/o c-diff. Had diarrhea at home the day before admission.

## 2021-01-26 NOTE — DISCHARGE SUMMARY
[facility]  DISCHARGE SUMMARY     Geena Sheikh Patient Status:  Inpatient    1980 MRN [id number]   [facility] 3NE-A Attending Lenard Enriquez MD   [facility] Day # 1 PCP Kayli Jaquez DO     Date of Admission: 2021  Date Anxiety. Quantity: 15 tablet  Refills: 0        CONTINUE taking these medications      Instructions Prescription details   Adderall XR 20 MG Cp24  Generic drug: Amphetamine-Dextroamphet ER      Take 20 mg by mouth every morning.    Refills: 0     Albutero sounds.     -----------------------------------------------------------------------------------------------  PATIENT DISCHARGE INSTRUCTIONS: See electronic chart    Ashley Burt MD    Time spent:  > 30 minutes

## 2021-01-26 NOTE — PROGRESS NOTES
[facility] Note:  Anticoagulation Weight Dose Adjustment for enoxaparin (LOVENOX)    Cale Juarez is a 45year old patient who has been prescribed enoxaparin (LOVENOX) 40 mg subcutaneous every 24 hours.       Estimated Creatinine Clearance: 128.8 mL/

## 2021-01-26 NOTE — PLAN OF CARE
Assumed care at 0730. Pt a/ox4, VSS, on RA, NSR on telemetry. Pt with no c/o n/v/d, SOB, dizziness/lightheadedness. No BM, contact+ iso and CDIFF testing d/c'd by MD. Pt with pain to BL hands and feet, PRN zanaflex ordered, scheduled ativan.  IVF running LR

## 2021-01-26 NOTE — ED NOTES
Nurse to Nurse report called to Josselin Garcia and Mary Nunez RN, [id number]. Pt resting comfortably on cart VSS in NAD. Patient transport ordered.

## 2021-01-26 NOTE — PROGRESS NOTES
NURSING DISCHARGE NOTE    Discharged Home via Wheelchair. Accompanied by Family member  Belongings Taken by patient/family. Discharge navigator completed. Discharge instructions explained to pt, pt verbalized understanding. All questions answered.  Pt s

## 2021-01-26 NOTE — PROGRESS NOTES
NURSING ADMISSION NOTE      Patient admitted via CART  Oriented to room. Safety precautions initiated. Bed in low position. Call light in reach. Plan of care discussed with patient,including safety precautions and used of call light. Verbalizes unde

## 2021-01-26 NOTE — PAYOR COMM NOTE
--------------  ADMISSION REVIEW     Payor: JOSE ALMAZAN  Subscriber #:  [id number]  Authorization Number: [id number]    Admit date: 1/25/21  Admit time: 1902       Patient Seen in: [facility] Emergency Department    History   Patient presents with:  Na Methocarbamol (ROBAXIN) injection 1,000 mg (has no administration in time range)   ondansetron HCl (ZOFRAN) injection 4 mg (4 mg Intravenous Given 1/25/21 1454)   LORazepam (ATIVAN) injection 1 mg (1 mg Intravenous Given 1/25/21 1454)   sodium chloride 0 43.27 kg/m²     HEENT: Normocephalic atraumatic. Moist mucous membranes. EOM-I. PERRLA. Anicteric. Neck: No lymphadenopathy. No JVD. No carotid bruits. Respiratory: Clear to auscultation bilaterally. No wheezes. No rhonchi.   Cardiovascular: S1, S2. Regul Route     1/26/2021 1149 New Bag (none) Intravenous     1/26/2021 0438 New Bag (none) Intravenous     1/25/2021 2003 New Bag (none) Intravenous       lamoTRIgine (LAMICTAL) tab 200 mg     Date Action Dose Route     1/26/2021 0848 Given 200 mg Oral       LO

## 2021-01-28 NOTE — PAYOR COMM NOTE
Payor:  JOSE ALMAZAN  Subscriber #:  [id number]  Authorization Number: [id number]    Admit date: 1/25/21  Admit time:  1902  Discharge Date: 1/26/2021

## 2021-02-07 ENCOUNTER — HOSPITAL ENCOUNTER (EMERGENCY)
Facility: HOSPITAL | Age: 41
Discharge: HOME | End: 2021-02-08
Attending: EMERGENCY MEDICINE
Payer: COMMERCIAL

## 2021-02-07 DIAGNOSIS — I10 ESSENTIAL HYPERTENSION: ICD-10-CM

## 2021-02-07 DIAGNOSIS — K52.9 GASTROENTERITIS: Primary | ICD-10-CM

## 2021-02-07 LAB
ALBUMIN SERPL-MCNC: 3.9 G/DL (ref 3.4–5)
ALBUMIN/GLOB SERPL: 1 {RATIO} (ref 1–2)
ALP LIVER SERPL-CCNC: 126 U/L
ALT SERPL-CCNC: 42 U/L
ANION GAP SERPL CALC-SCNC: 3 MMOL/L (ref 0–18)
AST SERPL-CCNC: 16 U/L (ref 15–37)
BASOPHILS # BLD AUTO: 0.03 X10(3) UL (ref 0–0.2)
BASOPHILS NFR BLD AUTO: 0.5 %
BILIRUB SERPL-MCNC: 0.3 MG/DL (ref 0.1–2)
BUN BLD-MCNC: 15 MG/DL (ref 7–18)
BUN/CREAT SERPL: 16.3 (ref 10–20)
CALCIUM BLD-MCNC: 9.1 MG/DL (ref 8.5–10.1)
CHLORIDE SERPL-SCNC: 106 MMOL/L (ref 98–112)
CO2 SERPL-SCNC: 29 MMOL/L (ref 21–32)
CREAT BLD-MCNC: 0.92 MG/DL
DEPRECATED RDW RBC AUTO: 42.1 FL (ref 35.1–46.3)
EOSINOPHIL # BLD AUTO: 0.09 X10(3) UL (ref 0–0.7)
EOSINOPHIL NFR BLD AUTO: 1.5 %
ERYTHROCYTE [DISTWIDTH] IN BLOOD BY AUTOMATED COUNT: 13.7 % (ref 11–15)
GLOBULIN PLAS-MCNC: 3.9 G/DL (ref 2.8–4.4)
GLUCOSE BLD-MCNC: 88 MG/DL (ref 70–99)
HCT VFR BLD AUTO: 45 %
HGB BLD-MCNC: 14.9 G/DL
IMM GRANULOCYTES # BLD AUTO: 0.02 X10(3) UL (ref 0–1)
IMM GRANULOCYTES NFR BLD: 0.3 %
LYMPHOCYTES # BLD AUTO: 2.14 X10(3) UL (ref 1–4)
LYMPHOCYTES NFR BLD AUTO: 36.1 %
M PROTEIN MFR SERPL ELPH: 7.8 G/DL (ref 6.4–8.2)
MCH RBC QN AUTO: 28 PG (ref 26–34)
MCHC RBC AUTO-ENTMCNC: 33.1 G/DL (ref 31–37)
MCV RBC AUTO: 84.6 FL
MONOCYTES # BLD AUTO: 0.52 X10(3) UL (ref 0.1–1)
MONOCYTES NFR BLD AUTO: 8.8 %
NEUTROPHILS # BLD AUTO: 3.12 X10 (3) UL (ref 1.5–7.7)
NEUTROPHILS # BLD AUTO: 3.12 X10(3) UL (ref 1.5–7.7)
NEUTROPHILS NFR BLD AUTO: 52.8 %
OSMOLALITY SERPL CALC.SUM OF ELEC: 286 MOSM/KG (ref 275–295)
PLATELET # BLD AUTO: 243 10(3)UL (ref 150–450)
POTASSIUM SERPL-SCNC: 4.3 MMOL/L (ref 3.5–5.1)
RBC # BLD AUTO: 5.32 X10(6)UL
SODIUM SERPL-SCNC: 138 MMOL/L (ref 136–145)
WBC # BLD AUTO: 5.9 X10(3) UL (ref 4–11)

## 2021-02-07 PROCEDURE — 85025 COMPLETE CBC W/AUTO DIFF WBC: CPT | Performed by: EMERGENCY MEDICINE

## 2021-02-07 PROCEDURE — 80053 COMPREHEN METABOLIC PANEL: CPT | Performed by: EMERGENCY MEDICINE

## 2021-02-07 PROCEDURE — 96361 HYDRATE IV INFUSION ADD-ON: CPT

## 2021-02-07 PROCEDURE — 96374 THER/PROPH/DIAG INJ IV PUSH: CPT

## 2021-02-07 PROCEDURE — 99284 EMERGENCY DEPT VISIT MOD MDM: CPT

## 2021-02-07 RX ORDER — ONDANSETRON 2 MG/ML
4 INJECTION INTRAMUSCULAR; INTRAVENOUS ONCE
Status: COMPLETED | OUTPATIENT
Start: 2021-02-07 | End: 2021-02-07

## 2021-02-08 VITALS
RESPIRATION RATE: 19 BRPM | HEART RATE: 68 BPM | SYSTOLIC BLOOD PRESSURE: 147 MMHG | DIASTOLIC BLOOD PRESSURE: 100 MMHG | WEIGHT: 315 LBS | BODY MASS INDEX: 41 KG/M2 | OXYGEN SATURATION: 96 %

## 2021-02-08 RX ORDER — AMLODIPINE BESYLATE 5 MG/1
5 TABLET ORAL DAILY
Qty: 30 TABLET | Refills: 0 | Status: SHIPPED | OUTPATIENT
Start: 2021-02-08 | End: 2021-03-10

## 2021-02-08 RX ORDER — ONDANSETRON 4 MG/1
4 TABLET, ORALLY DISINTEGRATING ORAL EVERY 4 HOURS PRN
Qty: 10 TABLET | Refills: 0 | Status: SHIPPED | OUTPATIENT
Start: 2021-02-08 | End: 2021-02-15

## 2021-02-08 NOTE — ED PROVIDER NOTES
Patient Seen in: [facility] Emergency Department      History   Patient presents with:  Hypertension    Stated Complaint: HTN    HPI/Subjective:   HPI    52-year-old male presents emergency department states they are having vomiting nausea and diarr transmission during my interaction with the patient were taken. The patient was already wearing a droplet mask on my arrival to the room.  Personal protective equipment including droplet mask, eye protection, and gloves were worn throughout the duration of Patient was evaluated and blood pressure here was 146/86. We will give some IV fluids along with some Zofran and reassess. Check some baseline labs.          Patient was evaluated the emergency department feeling significantly better after some Zofran a

## 2021-04-09 ENCOUNTER — OFFICE VISIT (OUTPATIENT)
Dept: FAMILY MEDICINE CLINIC | Facility: CLINIC | Age: 41
End: 2021-04-09
Payer: COMMERCIAL

## 2021-04-09 VITALS
HEART RATE: 88 BPM | OXYGEN SATURATION: 98 % | DIASTOLIC BLOOD PRESSURE: 80 MMHG | RESPIRATION RATE: 18 BRPM | WEIGHT: 315 LBS | SYSTOLIC BLOOD PRESSURE: 120 MMHG | HEIGHT: 73.5 IN | BODY MASS INDEX: 40.86 KG/M2

## 2021-04-09 DIAGNOSIS — Z00.00 ROUTINE GENERAL MEDICAL EXAMINATION AT A HEALTH CARE FACILITY: Primary | ICD-10-CM

## 2021-04-09 DIAGNOSIS — E66.01 CLASS 3 SEVERE OBESITY DUE TO EXCESS CALORIES WITHOUT SERIOUS COMORBIDITY WITH BODY MASS INDEX (BMI) OF 40.0 TO 44.9 IN ADULT (HCC): ICD-10-CM

## 2021-04-09 PROBLEM — K64.8 INTERNAL HEMORRHOIDS WITH COMPLICATION: Status: RESOLVED | Noted: 2020-09-08 | Resolved: 2021-04-09

## 2021-04-09 PROBLEM — K62.5 RECTAL BLEEDING: Status: RESOLVED | Noted: 2020-09-08 | Resolved: 2021-04-09

## 2021-04-09 PROBLEM — E66.813 CLASS 3 SEVERE OBESITY WITH BODY MASS INDEX (BMI) OF 40.0 TO 44.9 IN ADULT: Status: ACTIVE | Noted: 2021-04-09

## 2021-04-09 PROBLEM — E66.813 CLASS 3 SEVERE OBESITY WITH BODY MASS INDEX (BMI) OF 40.0 TO 44.9 IN ADULT (HCC): Status: ACTIVE | Noted: 2021-04-09

## 2021-04-09 PROCEDURE — 3079F DIAST BP 80-89 MM HG: CPT | Performed by: FAMILY MEDICINE

## 2021-04-09 PROCEDURE — 3008F BODY MASS INDEX DOCD: CPT | Performed by: FAMILY MEDICINE

## 2021-04-09 PROCEDURE — 3074F SYST BP LT 130 MM HG: CPT | Performed by: FAMILY MEDICINE

## 2021-04-09 PROCEDURE — 99395 PREV VISIT EST AGE 18-39: CPT | Performed by: FAMILY MEDICINE

## 2021-04-09 RX ORDER — ZOLPIDEM TARTRATE 10 MG/1
10 TABLET ORAL NIGHTLY PRN
COMMUNITY
Start: 2021-03-18 | End: 2021-05-13 | Stop reason: ALTCHOICE

## 2021-04-09 NOTE — PATIENT INSTRUCTIONS
Fast 8 hours for labs. Water, black coffee, or plain tea only. Any sugar in the system will alter the glucose level and triglyceride level. Please call [phone] or use [employer] to schedule for laboratories or radiology procedures.     Due to the 2

## 2021-04-09 NOTE — PROGRESS NOTES
HPI:  Here for a physical.    PAST MEDICAL HISTORY:  Past Medical History:   Diagnosis Date   • Anxiety state, unspecified    • Back pain    • Depression    • Displacement of lumbar intervertebral disc without myelopathy 6/20/2013    Log Date: 08/06/2012 F Sexual activity: Not on file    Other Topics      Concerns:         Service: Not Asked        Blood Transfusions: Not Asked        Caffeine Concern: No        Occupational Exposure: Not Asked        Hobby Hazards: Not Asked        Sleep Concern: No constipation. He was told he may have irritable bowel syndrome. He manages this with his diet. Corwith Marion GENITOURINARY: No complaints of dysuria, urgency or frequency; no erectile dysfunction. MUSCULOSKELETAL: No complaints of arthralgias or myalgias.   NEURO: without lesions, no discharge. LYMPHATIC: no lymphadenopathy palpated about the anterior and posterior cervical chains, submandibular and supraclavicular areas, axilla, and inguinal areas.   EXTREMITIES / MUSCULOSKELETAL: 4 extremities with grossly normal

## 2021-04-16 ENCOUNTER — TELEMEDICINE (OUTPATIENT)
Dept: FAMILY MEDICINE CLINIC | Facility: CLINIC | Age: 41
End: 2021-04-16

## 2021-04-16 DIAGNOSIS — Z23 COVID-19 VACCINE ADMINISTERED: ICD-10-CM

## 2021-04-16 DIAGNOSIS — Z86.69 H/O MIGRAINE: ICD-10-CM

## 2021-04-16 DIAGNOSIS — G44.52 NEW DAILY PERSISTENT HEADACHE: Primary | ICD-10-CM

## 2021-04-16 PROCEDURE — 99214 OFFICE O/P EST MOD 30 MIN: CPT | Performed by: FAMILY MEDICINE

## 2021-04-16 RX ORDER — TRAMADOL HYDROCHLORIDE 50 MG/1
50 TABLET ORAL EVERY 6 HOURS PRN
Qty: 30 TABLET | Refills: 1 | Status: SHIPPED | OUTPATIENT
Start: 2021-04-16 | End: 2021-07-16

## 2021-04-16 NOTE — PROGRESS NOTES
Qian Dsouza-In    [facility] verbally consents to a Virtual/Telephone Check-In visit on 04/16/21. Patient understands and accepts financial responsibility for any deductible, co-insurance and/or co-pays associated with this service.     D 24 Hr Take 100 mg by mouth daily. • Propranolol HCl ER 80 MG Oral Capsule SR 24 Hr Take 80 mg by mouth daily. • Amphetamine-Dextroamphet ER (ADDERALL XR) 20 MG Oral Capsule SR 24 Hr Take 20 mg by mouth every morning.        ALLERGIES:   Patient ha restrictions of visitation. There are limitations of this visit as no physical exam could be performed. Every conscious effort was taken to allow for sufficient and adequate time.   This billing was spent on reviewing labs, medications, radiology tests an

## 2021-04-17 ENCOUNTER — LAB ENCOUNTER (OUTPATIENT)
Dept: LAB | Age: 41
End: 2021-04-17
Attending: FAMILY MEDICINE
Payer: COMMERCIAL

## 2021-04-17 ENCOUNTER — HOSPITAL ENCOUNTER (OUTPATIENT)
Dept: CT IMAGING | Age: 41
Discharge: HOME | End: 2021-04-17
Attending: FAMILY MEDICINE
Payer: COMMERCIAL

## 2021-04-17 DIAGNOSIS — E66.01 CLASS 3 SEVERE OBESITY DUE TO EXCESS CALORIES WITHOUT SERIOUS COMORBIDITY WITH BODY MASS INDEX (BMI) OF 40.0 TO 44.9 IN ADULT (HCC): ICD-10-CM

## 2021-04-17 DIAGNOSIS — Z23 COVID-19 VACCINE ADMINISTERED: ICD-10-CM

## 2021-04-17 DIAGNOSIS — Z00.00 ROUTINE GENERAL MEDICAL EXAMINATION AT A HEALTH CARE FACILITY: ICD-10-CM

## 2021-04-17 DIAGNOSIS — Z86.69 H/O MIGRAINE: ICD-10-CM

## 2021-04-17 DIAGNOSIS — G44.52 NEW DAILY PERSISTENT HEADACHE: ICD-10-CM

## 2021-04-17 PROCEDURE — 80053 COMPREHEN METABOLIC PANEL: CPT

## 2021-04-17 PROCEDURE — 36415 COLL VENOUS BLD VENIPUNCTURE: CPT

## 2021-04-17 PROCEDURE — 84443 ASSAY THYROID STIM HORMONE: CPT

## 2021-04-17 PROCEDURE — 80061 LIPID PANEL: CPT

## 2021-04-17 PROCEDURE — 83036 HEMOGLOBIN GLYCOSYLATED A1C: CPT

## 2021-04-17 PROCEDURE — 86337 INSULIN ANTIBODIES: CPT

## 2021-04-17 PROCEDURE — 70450 CT HEAD/BRAIN W/O DYE: CPT | Performed by: FAMILY MEDICINE

## 2021-05-12 ENCOUNTER — HOSPITAL ENCOUNTER (EMERGENCY)
Age: 41
Discharge: HOME | End: 2021-05-12
Attending: EMERGENCY MEDICINE
Payer: COMMERCIAL

## 2021-05-12 ENCOUNTER — APPOINTMENT (OUTPATIENT)
Dept: CT IMAGING | Age: 41
End: 2021-05-12
Attending: EMERGENCY MEDICINE
Payer: COMMERCIAL

## 2021-05-12 ENCOUNTER — APPOINTMENT (OUTPATIENT)
Dept: GENERAL RADIOLOGY | Age: 41
End: 2021-05-12
Attending: EMERGENCY MEDICINE
Payer: COMMERCIAL

## 2021-05-12 VITALS
TEMPERATURE: 98 F | SYSTOLIC BLOOD PRESSURE: 129 MMHG | HEART RATE: 79 BPM | BODY MASS INDEX: 40.43 KG/M2 | DIASTOLIC BLOOD PRESSURE: 89 MMHG | OXYGEN SATURATION: 97 % | HEIGHT: 74 IN | RESPIRATION RATE: 18 BRPM | WEIGHT: 315 LBS

## 2021-05-12 DIAGNOSIS — J20.9 ACUTE BRONCHITIS WITH BRONCHOSPASM: Primary | ICD-10-CM

## 2021-05-12 PROCEDURE — 83690 ASSAY OF LIPASE: CPT | Performed by: EMERGENCY MEDICINE

## 2021-05-12 PROCEDURE — 85610 PROTHROMBIN TIME: CPT | Performed by: EMERGENCY MEDICINE

## 2021-05-12 PROCEDURE — 71045 X-RAY EXAM CHEST 1 VIEW: CPT | Performed by: EMERGENCY MEDICINE

## 2021-05-12 PROCEDURE — 71260 CT THORAX DX C+: CPT | Performed by: EMERGENCY MEDICINE

## 2021-05-12 PROCEDURE — 99285 EMERGENCY DEPT VISIT HI MDM: CPT

## 2021-05-12 PROCEDURE — 80053 COMPREHEN METABOLIC PANEL: CPT | Performed by: EMERGENCY MEDICINE

## 2021-05-12 PROCEDURE — 82565 ASSAY OF CREATININE: CPT

## 2021-05-12 PROCEDURE — 84484 ASSAY OF TROPONIN QUANT: CPT | Performed by: EMERGENCY MEDICINE

## 2021-05-12 PROCEDURE — 96375 TX/PRO/DX INJ NEW DRUG ADDON: CPT

## 2021-05-12 PROCEDURE — 85379 FIBRIN DEGRADATION QUANT: CPT | Performed by: EMERGENCY MEDICINE

## 2021-05-12 PROCEDURE — 93005 ELECTROCARDIOGRAM TRACING: CPT

## 2021-05-12 PROCEDURE — 93010 ELECTROCARDIOGRAM REPORT: CPT

## 2021-05-12 PROCEDURE — 85025 COMPLETE CBC W/AUTO DIFF WBC: CPT | Performed by: EMERGENCY MEDICINE

## 2021-05-12 PROCEDURE — 96374 THER/PROPH/DIAG INJ IV PUSH: CPT

## 2021-05-12 PROCEDURE — 94640 AIRWAY INHALATION TREATMENT: CPT

## 2021-05-12 PROCEDURE — 85730 THROMBOPLASTIN TIME PARTIAL: CPT | Performed by: EMERGENCY MEDICINE

## 2021-05-12 RX ORDER — METHYLPREDNISOLONE SODIUM SUCCINATE 125 MG/2ML
125 INJECTION, POWDER, LYOPHILIZED, FOR SOLUTION INTRAMUSCULAR; INTRAVENOUS ONCE
Status: COMPLETED | OUTPATIENT
Start: 2021-05-12 | End: 2021-05-12

## 2021-05-12 RX ORDER — ALBUTEROL SULFATE 90 UG/1
8 AEROSOL, METERED RESPIRATORY (INHALATION) ONCE
Status: COMPLETED | OUTPATIENT
Start: 2021-05-12 | End: 2021-05-12

## 2021-05-12 RX ORDER — KETOROLAC TROMETHAMINE 15 MG/ML
15 INJECTION, SOLUTION INTRAMUSCULAR; INTRAVENOUS ONCE
Status: COMPLETED | OUTPATIENT
Start: 2021-05-12 | End: 2021-05-12

## 2021-05-12 RX ORDER — BENZONATATE 100 MG/1
100 CAPSULE ORAL 3 TIMES DAILY PRN
Qty: 30 CAPSULE | Refills: 0 | Status: SHIPPED | OUTPATIENT
Start: 2021-05-12 | End: 2021-06-09

## 2021-05-12 NOTE — ED PROVIDER NOTES
Patient Seen in: THE [facility] Emergency Department In [place]      History   Patient presents with:  Difficulty Breathing    Stated Complaint: america     HPI/Subjective:   HPI    45-year-old male presents emergency room with chief complaint of cough and shortn Review of Systems    Positive for stated complaint: america   Other systems are as noted in HPI. Constitutional and vital signs reviewed. All other systems reviewed and negative except as noted above.     Physical Exam     ED Triage Vitals [05/12/21 ---------                               -----------         ------                     CBC W/ DIFFERENTIAL[[id number]]          Abnormal            Final result                 Please view results for these tests on the individual orders.    RAINBOW DRAW B instructed to follow-up with primary care provider for further evaluation treatment, return immediately to ER for worsening, concerning, new, or changing/persisting symptoms.   I discussed the case with the patient and they had no questions, complaints, or

## 2021-05-12 NOTE — ED INITIAL ASSESSMENT (HPI)
Dx with pneumonia at immediate care last night-- increased sob today- advised to come here- did have Covid in Dec  Sob for about 3 wks

## 2021-05-13 ENCOUNTER — OFFICE VISIT (OUTPATIENT)
Dept: NEUROLOGY | Facility: CLINIC | Age: 41
End: 2021-05-13
Payer: COMMERCIAL

## 2021-05-13 VITALS — HEART RATE: 74 BPM | DIASTOLIC BLOOD PRESSURE: 78 MMHG | SYSTOLIC BLOOD PRESSURE: 122 MMHG | RESPIRATION RATE: 16 BRPM

## 2021-05-13 DIAGNOSIS — IMO0002 CHRONIC MIGRAINE: Primary | ICD-10-CM

## 2021-05-13 DIAGNOSIS — R41.89 COGNITIVE CHANGE: ICD-10-CM

## 2021-05-13 PROCEDURE — 3078F DIAST BP <80 MM HG: CPT | Performed by: OTHER

## 2021-05-13 PROCEDURE — 3074F SYST BP LT 130 MM HG: CPT | Performed by: OTHER

## 2021-05-13 PROCEDURE — 99244 OFF/OP CNSLTJ NEW/EST MOD 40: CPT | Performed by: OTHER

## 2021-05-13 RX ORDER — LAMOTRIGINE 300 MG/1
TABLET, EXTENDED RELEASE ORAL DAILY
COMMUNITY

## 2021-05-13 NOTE — PROGRESS NOTES
Patient here for evaluation of migraines. Has been also experiencing trouble with word finding, lost in familiar places and focusing.

## 2021-05-13 NOTE — PROGRESS NOTES
[facility] CONSULT[facility]    Date of consult: 5/13/2021    CC/Reason for consult: headache, memory loss  Consult Requested by Vaishnavi Cardoza MD    HPI: Soraida Lemons is a 37year old male with past medical history as listed below presents ANKLE FRACTURE SURGERY     • EXCIS LUMBAR DISK,ONE LEVEL      fusion L2-3   • REVISE ULNAR NERVE AT ELBOW  2009    ulnar release to both arms.     • SPINAL FUSION  2014     Social History:  Social History    Tobacco Use      Smoking status: Never Smoker this encounter. The patient indicates understanding of these issues and agrees with the plan.   RTC 4 weeks, may consider neuropsych test in the future   Pt should go ER for any new or worsening symptoms     Ruth Ann Smith MD   Neurology  [facility]

## 2021-05-24 ENCOUNTER — TELEPHONE (OUTPATIENT)
Dept: FAMILY MEDICINE CLINIC | Facility: CLINIC | Age: 41
End: 2021-05-24

## 2021-05-24 NOTE — TELEPHONE ENCOUNTER
Pt got the letter stating CT that was completed would need a peer to peer. Please see message below. Will you do peer to peer?          Good afternoon,     Denial was received for the urgent request of  CT BRAIN OR HEAD ([id number]).       Reason for denial cr

## 2021-05-24 NOTE — TELEPHONE ENCOUNTER
MRI Brain denial reason:  Advanced imaging for typical migraine or tension type headache is not indicated.     Peer to peer can be scheduled by calling [phone]  ID#[id number]

## 2021-05-24 NOTE — TELEPHONE ENCOUNTER
Pt calling states that his insurance wont cover CT scan of  Head/Brain due to need more information needed. Pt calling to ask if we can code it differently and give more information to get this covered. Please call back to advise.

## 2021-05-24 NOTE — TELEPHONE ENCOUNTER
Pt stated that Dr. Osmani Gutierrez wants him to have a sleep study done; last one was in 5/2013. He needs a new c pap machine, but needs new order. Also, pt hasn't recd call for auth of MRI. Please advise.

## 2021-05-24 NOTE — TELEPHONE ENCOUNTER
Had new onset headaches. This is an indication for CT. I see that the neurologist we referred him to has now ordered an MRI. So the neurologist must agree something is going on in the brain. 1st Trimester Sonogram/20 Week Level II Sonogram

## 2021-05-25 NOTE — TELEPHONE ENCOUNTER
MRI of brain ordered by neurology denied, defer to neurology?  (Dr. Genesis Milton was routed message with denial).

## 2021-05-25 NOTE — TELEPHONE ENCOUNTER
Yes please order sleep medicine consult and schedule P2P for me (anytime when I am in clinic is fine), thanks.

## 2021-05-26 NOTE — TELEPHONE ENCOUNTER
I called the [id number] line at 304 pm on May 26, 2021. Spoke to the intake person. Spoke to the nurse. Awaited the peer to peer conversation from the physician. At 3:23 PM I ended the phone call. This patient had a physical on April 9.   Had a Mod

## 2021-05-27 NOTE — TELEPHONE ENCOUNTER
Noted. Will you be able to call again? I'm sorry for the wait, but unfortunately, the insurance won't take this from an RN.      Let me know if there is specific time you want to do it; maybe we can call ahead of time and wait on hold and give call to you o

## 2021-06-09 ENCOUNTER — OFFICE VISIT (OUTPATIENT)
Dept: INTERNAL MEDICINE CLINIC | Facility: CLINIC | Age: 41
End: 2021-06-09
Payer: COMMERCIAL

## 2021-06-09 ENCOUNTER — LAB ENCOUNTER (OUTPATIENT)
Dept: LAB | Age: 41
End: 2021-06-09
Attending: INTERNAL MEDICINE
Payer: COMMERCIAL

## 2021-06-09 VITALS
SYSTOLIC BLOOD PRESSURE: 120 MMHG | WEIGHT: 315 LBS | BODY MASS INDEX: 40.43 KG/M2 | HEIGHT: 74 IN | HEART RATE: 66 BPM | DIASTOLIC BLOOD PRESSURE: 82 MMHG | OXYGEN SATURATION: 98 %

## 2021-06-09 DIAGNOSIS — R73.01 IFG (IMPAIRED FASTING GLUCOSE): ICD-10-CM

## 2021-06-09 DIAGNOSIS — Z01.818 PREOP EXAMINATION: ICD-10-CM

## 2021-06-09 DIAGNOSIS — Z51.81 ENCOUNTER FOR THERAPEUTIC DRUG MONITORING: Primary | ICD-10-CM

## 2021-06-09 DIAGNOSIS — E66.01 CLASS 3 SEVERE OBESITY WITH BODY MASS INDEX (BMI) OF 40.0 TO 44.9 IN ADULT, UNSPECIFIED OBESITY TYPE, UNSPECIFIED WHETHER SERIOUS COMORBIDITY PRESENT (HCC): ICD-10-CM

## 2021-06-09 DIAGNOSIS — F31.9 BIPOLAR DISEASE, CHRONIC (HCC): ICD-10-CM

## 2021-06-09 DIAGNOSIS — Z11.59 SCREENING FOR VIRAL DISEASE: ICD-10-CM

## 2021-06-09 DIAGNOSIS — G25.79: ICD-10-CM

## 2021-06-09 DIAGNOSIS — E78.2 MIXED HYPERLIPIDEMIA: ICD-10-CM

## 2021-06-09 DIAGNOSIS — M54.9 BACK PAIN, UNSPECIFIED BACK LOCATION, UNSPECIFIED BACK PAIN LATERALITY, UNSPECIFIED CHRONICITY: ICD-10-CM

## 2021-06-09 PROCEDURE — 3079F DIAST BP 80-89 MM HG: CPT | Performed by: PHYSICIAN ASSISTANT

## 2021-06-09 PROCEDURE — 99204 OFFICE O/P NEW MOD 45 MIN: CPT | Performed by: PHYSICIAN ASSISTANT

## 2021-06-09 PROCEDURE — 3008F BODY MASS INDEX DOCD: CPT | Performed by: PHYSICIAN ASSISTANT

## 2021-06-09 PROCEDURE — 3074F SYST BP LT 130 MM HG: CPT | Performed by: PHYSICIAN ASSISTANT

## 2021-06-09 NOTE — PROGRESS NOTES
HISTORY OF PRESENT ILLNESS  Patient presents with:  Weight Problem      Emil Hua is a 37year old male new to our office today for initiation of medical weight loss program.  Patient presents today with c/o excess weight.        Running is difficul disorder: negative   Migraines: Yes - propranolol  Seizures: negative   Joint-related conditions: L ankle pain, spinal fusion  Liver disease: negative   Renal disease: negative   Diabetes: negative  Thyroid disease: negative   Constipation: negative  [facility] Lab Results   Component Value Date     (H) 05/12/2021    BUN 17 05/12/2021    BUNCREA 16.5 05/12/2021    CREATSERUM 1.03 05/12/2021    ANIONGAP 5 05/12/2021    GFRNAA 94 05/12/2021    GFRAA 108 05/12/2021    CA 9.2 05/12/2021    OSMOCALC 287 05/12/2 unspecified obesity type, unspecified whether serious comorbidity present ([facility] [address]    IFG (impaired fasting glucose)    Mixed hyperlipidemia    Bipolar disease, chronic (HCC)    Serotonergic syndrome    Back pain, unspecified back location, unspecified back p vegetables/day  iii. Avoid skipping meals- eat every 4-5 hours  iv. Aim for 3 meals/day  2. Drink lots of water and cut down on soda/juice consumption if soda/juice drinker  3.  Focus on protein: (15-30 grams with each meal) ie. greek yogurt, cottage cheese eggs  -natural cheeses  -nuts (measure portion size)   -unsweetened nut butters  -dried edamame   -alysa seeds soaked in water or almond milk  -soy nuts  -cured meats (monitor for sodium issues)   -hummus with vegetables  -bean dip with vegetables     FRUIT

## 2021-06-09 NOTE — PATIENT INSTRUCTIONS
We are here to support you with weight loss, but please remember that you still need your primary care provider for your routine health maintenance.       PLAN:  Ozempic 0.25mg weekly  Go to the lab for blood work   Follow up with me in 1 month  Schedule fo ** Daily INPUT> Look at nutrition section-- \"nutrients\" and it will break down your macros for the day (ie. Protein, carbs, fibers, sugars and fats). Try to stay within these numbers daily     2.  \"7 minute workout\" to help with exercise/activity [facility]

## 2021-06-11 ENCOUNTER — HOSPITAL ENCOUNTER (OUTPATIENT)
Dept: MRI IMAGING | Age: 41
Discharge: HOME | End: 2021-06-11
Attending: Other
Payer: COMMERCIAL

## 2021-06-11 DIAGNOSIS — IMO0002 CHRONIC MIGRAINE: ICD-10-CM

## 2021-06-11 PROCEDURE — 70551 MRI BRAIN STEM W/O DYE: CPT | Performed by: OTHER

## 2021-06-12 ENCOUNTER — APPOINTMENT (OUTPATIENT)
Dept: SLEEP CENTER | Age: 41
End: 2021-06-12
Attending: Other
Payer: COMMERCIAL

## 2021-06-14 ENCOUNTER — ORDER TRANSCRIPTION (OUTPATIENT)
Dept: SLEEP CENTER | Age: 41
End: 2021-06-14

## 2021-06-14 DIAGNOSIS — Z01.818 PREOP EXAMINATION: Primary | ICD-10-CM

## 2021-06-14 DIAGNOSIS — Z11.59 SCREENING FOR VIRAL DISEASE: ICD-10-CM

## 2021-06-18 ENCOUNTER — NURSE ONLY (OUTPATIENT)
Dept: ELECTROPHYSIOLOGY | Facility: HOSPITAL | Age: 41
End: 2021-06-18
Attending: Other
Payer: COMMERCIAL

## 2021-06-18 DIAGNOSIS — R41.89 COGNITIVE CHANGE: ICD-10-CM

## 2021-06-18 DIAGNOSIS — IMO0002 CHRONIC MIGRAINE: ICD-10-CM

## 2021-06-18 PROCEDURE — 95816 EEG AWAKE AND DROWSY: CPT | Performed by: OTHER

## 2021-06-18 NOTE — PROCEDURES
Date of Procedure: 6/18/2021    Procedure: EEG (ELECTROENCEPHALOGRAM)     DX: HEADACHE, MEMORY LOSS  HX: 42 Y/O MALE WITH C/O HEADACHES AND MEMORY LOSS. HE HAD COVID LAST DECEMBER AND HEADACHES SEEM WORSE SINCE.     RX: LAMICTAL, BENZONATATE, TRAMADOL, PRO

## 2021-07-02 ENCOUNTER — LAB ENCOUNTER (OUTPATIENT)
Dept: LAB | Age: 41
End: 2021-07-02
Attending: Other
Payer: COMMERCIAL

## 2021-07-02 DIAGNOSIS — Z01.818 PREOP EXAMINATION: ICD-10-CM

## 2021-07-02 DIAGNOSIS — Z11.59 SCREENING FOR VIRAL DISEASE: ICD-10-CM

## 2021-07-03 LAB — SARS-COV-2 RNA RESP QL NAA+PROBE: NOT DETECTED

## 2021-07-05 ENCOUNTER — OFFICE VISIT (OUTPATIENT)
Dept: SLEEP CENTER | Age: 41
End: 2021-07-05
Attending: INTERNAL MEDICINE
Payer: COMMERCIAL

## 2021-07-05 DIAGNOSIS — R41.89 COGNITIVE CHANGE: ICD-10-CM

## 2021-07-05 PROCEDURE — 95811 POLYSOM 6/>YRS CPAP 4/> PARM: CPT

## 2021-07-16 ENCOUNTER — TELEPHONE (OUTPATIENT)
Dept: INTERNAL MEDICINE CLINIC | Facility: CLINIC | Age: 41
End: 2021-07-16

## 2021-07-16 ENCOUNTER — OFFICE VISIT (OUTPATIENT)
Dept: INTERNAL MEDICINE CLINIC | Facility: CLINIC | Age: 41
End: 2021-07-16
Payer: COMMERCIAL

## 2021-07-16 VITALS
WEIGHT: 315 LBS | DIASTOLIC BLOOD PRESSURE: 80 MMHG | HEIGHT: 74 IN | HEART RATE: 80 BPM | RESPIRATION RATE: 16 BRPM | SYSTOLIC BLOOD PRESSURE: 126 MMHG | BODY MASS INDEX: 40.43 KG/M2

## 2021-07-16 DIAGNOSIS — R73.01 IFG (IMPAIRED FASTING GLUCOSE): ICD-10-CM

## 2021-07-16 DIAGNOSIS — E78.2 MIXED HYPERLIPIDEMIA: ICD-10-CM

## 2021-07-16 DIAGNOSIS — G47.33 OSA (OBSTRUCTIVE SLEEP APNEA): ICD-10-CM

## 2021-07-16 DIAGNOSIS — E66.01 CLASS 3 SEVERE OBESITY WITH SERIOUS COMORBIDITY AND BODY MASS INDEX (BMI) OF 40.0 TO 44.9 IN ADULT, UNSPECIFIED OBESITY TYPE (HCC): ICD-10-CM

## 2021-07-16 DIAGNOSIS — Z51.81 ENCOUNTER FOR THERAPEUTIC DRUG MONITORING: Primary | ICD-10-CM

## 2021-07-16 DIAGNOSIS — F31.9 BIPOLAR DISEASE, CHRONIC (HCC): ICD-10-CM

## 2021-07-16 DIAGNOSIS — E66.01 OBESITY, CLASS III, BMI 40-49.9 (MORBID OBESITY) (HCC): Primary | ICD-10-CM

## 2021-07-16 PROCEDURE — 3074F SYST BP LT 130 MM HG: CPT | Performed by: PHYSICIAN ASSISTANT

## 2021-07-16 PROCEDURE — 99214 OFFICE O/P EST MOD 30 MIN: CPT | Performed by: PHYSICIAN ASSISTANT

## 2021-07-16 PROCEDURE — 3008F BODY MASS INDEX DOCD: CPT | Performed by: PHYSICIAN ASSISTANT

## 2021-07-16 PROCEDURE — 3079F DIAST BP 80-89 MM HG: CPT | Performed by: PHYSICIAN ASSISTANT

## 2021-07-16 RX ORDER — SEMAGLUTIDE 1.34 MG/ML
INJECTION, SOLUTION SUBCUTANEOUS
COMMUNITY
End: 2021-07-20 | Stop reason: ALTCHOICE

## 2021-07-16 RX ORDER — SEMAGLUTIDE 1.34 MG/ML
0.5 INJECTION, SOLUTION SUBCUTANEOUS
Qty: 1 EACH | Refills: 0 | Status: SHIPPED | OUTPATIENT
Start: 2021-07-16

## 2021-07-16 NOTE — PROGRESS NOTES
HISTORY OF PRESENT ILLNESS  Patient presents with:  Weight Check: down 7 lbs       Kristine Cam is a 37year old adult here for follow up in medical weight loss program.   Down 7lbs  Compliant on Ozempic  Denies chest pain, shortness of breath, dizzin Component Value Date    WBC 5.9 05/12/2021    RBC 5.95 (H) 05/12/2021    HGB 16.4 05/12/2021    HCT 50.5 05/12/2021    MCV 84.9 05/12/2021    MCH 27.6 05/12/2021    MCHC 32.5 05/12/2021    RDW 13.4 05/12/2021    .0 05/12/2021    MPV 11.8 (H) 03/31 orders for this visit:    Encounter for therapeutic drug monitoring    Class 3 severe obesity with serious comorbidity and body mass index (BMI) of 40.0 to 44.9 in adult, unspecified obesity type (HCC)    IFG (impaired fasting glucose)    Mixed hyperlipide weight management. Patient verbalizes understanding.     Chucky Habermann, PA-C  7/16/2021

## 2021-07-16 NOTE — PATIENT INSTRUCTIONS
[facility]  Low Carb Kitchen   Fit [employer]  Freshly  Snap Kitchen  Clean [place]

## 2021-07-16 NOTE — TELEPHONE ENCOUNTER
Raquel Rosales, RN  Dandre García,     Can you please add dx of Morbid Obesity E66.01 to existing Dietitian order as Therapeutic Drug Monitoring is not covered for Dietitians and patient is seeing Wicho Robin on Monday.      If there are any cardiovascu

## 2021-07-19 ENCOUNTER — OFFICE VISIT (OUTPATIENT)
Dept: INTERNAL MEDICINE CLINIC | Facility: CLINIC | Age: 41
End: 2021-07-19
Payer: COMMERCIAL

## 2021-07-19 VITALS — WEIGHT: 315 LBS | BODY MASS INDEX: 40.43 KG/M2 | HEIGHT: 74 IN

## 2021-07-19 DIAGNOSIS — E66.01 CLASS 3 SEVERE OBESITY DUE TO EXCESS CALORIES WITH SERIOUS COMORBIDITY AND BODY MASS INDEX (BMI) OF 40.0 TO 44.9 IN ADULT (HCC): Primary | ICD-10-CM

## 2021-07-19 PROCEDURE — 97802 MEDICAL NUTRITION INDIV IN: CPT | Performed by: DIETITIAN, REGISTERED

## 2021-07-19 PROCEDURE — 3008F BODY MASS INDEX DOCD: CPT | Performed by: DIETITIAN, REGISTERED

## 2021-07-19 NOTE — PROGRESS NOTES
INITIAL OUTPATIENT NUTRITION CONSULTATION    Nutrition Assessment    Medical Diagnosis: Obesity    Physical Findings: Poor sleep    Client Age and Gender: 37year old adult    Marital Status and Occupation: , works in [address]:   No componen kcals per day. Scans barcodes. Ordered a food scale. Struggles w/ consistency over the weekend.     Food/Beverage Intake:   Breakfast: Egg + 3 egg whites + 3 slices toast w/ butter  Lunch: Pre-made salads + Rx or Perfect Bars  Dinner: Wife cooks- cajun rice

## 2021-07-20 ENCOUNTER — OFFICE VISIT (OUTPATIENT)
Dept: NEUROLOGY | Facility: CLINIC | Age: 41
End: 2021-07-20
Payer: COMMERCIAL

## 2021-07-20 VITALS — HEART RATE: 70 BPM | DIASTOLIC BLOOD PRESSURE: 84 MMHG | SYSTOLIC BLOOD PRESSURE: 130 MMHG | RESPIRATION RATE: 16 BRPM

## 2021-07-20 DIAGNOSIS — R41.89 COGNITIVE CHANGE: ICD-10-CM

## 2021-07-20 DIAGNOSIS — IMO0002 CHRONIC MIGRAINE: Primary | ICD-10-CM

## 2021-07-20 PROCEDURE — 3075F SYST BP GE 130 - 139MM HG: CPT | Performed by: OTHER

## 2021-07-20 PROCEDURE — 99214 OFFICE O/P EST MOD 30 MIN: CPT | Performed by: OTHER

## 2021-07-20 PROCEDURE — 3079F DIAST BP 80-89 MM HG: CPT | Performed by: OTHER

## 2021-07-20 NOTE — PROGRESS NOTES
[facility] Neurology Outpatient Progress Note  Date of service: 7/20/2021    Patient here for a follow-up visit. Since last visit symptoms are about same, maybe slightly better; had sleep study done, is to follow up with them re; cpap and sleep disorder.   Misbah Madrigal comment: no longer smoking    Alcohol use: Yes      Comment: social 3x per week, now more rare    Family History   Problem Relation Age of Onset   • Diabetes Paternal Grandfather    • ADHD Son       Neurological Examination:  /84   Pulse 70   Resp 16

## 2021-08-30 ENCOUNTER — TELEPHONE (OUTPATIENT)
Dept: INTERNAL MEDICINE CLINIC | Facility: CLINIC | Age: 41
End: 2021-08-30

## 2021-08-30 NOTE — TELEPHONE ENCOUNTER
Patient left a message he missed his scheduled appointment and needs a refill, his child is sick. He was scheduled 8/20/21 and was a no show. He has not rescheduled.

## 2021-10-01 ENCOUNTER — OFFICE VISIT (OUTPATIENT)
Dept: INTERNAL MEDICINE CLINIC | Facility: CLINIC | Age: 41
End: 2021-10-01
Payer: COMMERCIAL

## 2021-10-01 VITALS
HEART RATE: 71 BPM | SYSTOLIC BLOOD PRESSURE: 122 MMHG | OXYGEN SATURATION: 97 % | DIASTOLIC BLOOD PRESSURE: 82 MMHG | BODY MASS INDEX: 39.17 KG/M2 | WEIGHT: 315 LBS | RESPIRATION RATE: 17 BRPM | HEIGHT: 75 IN

## 2021-10-01 DIAGNOSIS — E66.01 OBESITY, CLASS III, BMI 40-49.9 (MORBID OBESITY) (HCC): ICD-10-CM

## 2021-10-01 DIAGNOSIS — F31.9 BIPOLAR DISEASE, CHRONIC (HCC): ICD-10-CM

## 2021-10-01 DIAGNOSIS — Z51.81 ENCOUNTER FOR THERAPEUTIC DRUG MONITORING: Primary | ICD-10-CM

## 2021-10-01 DIAGNOSIS — R73.01 IFG (IMPAIRED FASTING GLUCOSE): ICD-10-CM

## 2021-10-01 DIAGNOSIS — E78.2 MIXED HYPERLIPIDEMIA: ICD-10-CM

## 2021-10-01 PROCEDURE — 3008F BODY MASS INDEX DOCD: CPT | Performed by: PHYSICIAN ASSISTANT

## 2021-10-01 PROCEDURE — 3074F SYST BP LT 130 MM HG: CPT | Performed by: PHYSICIAN ASSISTANT

## 2021-10-01 PROCEDURE — 99214 OFFICE O/P EST MOD 30 MIN: CPT | Performed by: PHYSICIAN ASSISTANT

## 2021-10-01 PROCEDURE — 3079F DIAST BP 80-89 MM HG: CPT | Performed by: PHYSICIAN ASSISTANT

## 2021-10-01 RX ORDER — SEMAGLUTIDE 1.34 MG/ML
0.5 INJECTION, SOLUTION SUBCUTANEOUS
Qty: 1 EACH | Refills: 0 | Status: CANCELLED | OUTPATIENT
Start: 2021-10-01

## 2021-10-01 RX ORDER — VILAZODONE HYDROCHLORIDE 10 MG/1
TABLET ORAL
COMMUNITY
Start: 2021-09-23

## 2021-10-01 NOTE — PROGRESS NOTES
HISTORY OF PRESENT ILLNESS  Patient presents with:  Weight Check: down 2      Vidal Brooks is a 37year old adult here for follow up in medical weight loss program.   Down 2lbs  Compliant on ozempic  Denies chest pain, shortness of breath, dizziness, 84.9 05/12/2021    MCH 27.6 05/12/2021    MCHC 32.5 05/12/2021    RDW 13.4 05/12/2021    .0 05/12/2021    MPV 11.8 (H) 03/31/2012     Lab Results   Component Value Date     (H) 05/12/2021    BUN 17 05/12/2021    BUNCREA 16.5 05/12/2021    CRE (morbid obesity) (HCC)    IFG (impaired fasting glucose)    Mixed hyperlipidemia    Bipolar disease, chronic ([facility] [address]    Other orders  -     Cancel: Semaglutide,0.25 or 0.5MG/DOS, (OZEMPIC, 0.25 OR 0.5 MG/DOSE,) 2 MG/1.5ML Subcutaneous Solution Pen-injector;

## 2021-10-06 PROBLEM — Z99.89 OSA ON CPAP: Status: ACTIVE | Noted: 2021-10-06

## 2021-10-06 PROBLEM — G47.33 OSA ON CPAP: Status: ACTIVE | Noted: 2021-10-06

## 2021-11-17 ENCOUNTER — OFFICE VISIT (OUTPATIENT)
Dept: NEUROLOGY | Facility: CLINIC | Age: 41
End: 2021-11-17
Payer: COMMERCIAL

## 2021-11-17 VITALS
DIASTOLIC BLOOD PRESSURE: 90 MMHG | WEIGHT: 315 LBS | RESPIRATION RATE: 16 BRPM | BODY MASS INDEX: 40.43 KG/M2 | HEART RATE: 90 BPM | SYSTOLIC BLOOD PRESSURE: 134 MMHG | HEIGHT: 74 IN

## 2021-11-17 DIAGNOSIS — R41.3 MEMORY DEFICIT: Primary | ICD-10-CM

## 2021-11-17 PROCEDURE — 3080F DIAST BP >= 90 MM HG: CPT | Performed by: OTHER

## 2021-11-17 PROCEDURE — 3008F BODY MASS INDEX DOCD: CPT | Performed by: OTHER

## 2021-11-17 PROCEDURE — 3075F SYST BP GE 130 - 139MM HG: CPT | Performed by: OTHER

## 2021-11-17 PROCEDURE — 99214 OFFICE O/P EST MOD 30 MIN: CPT | Performed by: OTHER

## 2021-11-17 NOTE — PROGRESS NOTES
[facility] Neurology Outpatient Progress Note  Date of service: 11/17/2021    Patient here for a follow-up visit. Memory still not good. Sleep is better with cpap machine. Mood swings, sees psychiatrist, adjusting meds. Works on weight loss.   Headache still p Smoker      Smokeless tobacco: Never Used      Tobacco comment: no longer smoking    Alcohol use: Yes      Comment: social 3x per week, now more rare    Family History   Problem Relation Age of Onset   • No Known Problems Father    • No Known Problems Moth

## 2021-11-19 ENCOUNTER — TELEMEDICINE (OUTPATIENT)
Dept: INTERNAL MEDICINE CLINIC | Facility: CLINIC | Age: 41
End: 2021-11-19

## 2021-11-19 DIAGNOSIS — E66.01 OBESITY, CLASS III, BMI 40-49.9 (MORBID OBESITY) (HCC): ICD-10-CM

## 2021-11-19 DIAGNOSIS — E78.2 MIXED HYPERLIPIDEMIA: ICD-10-CM

## 2021-11-19 DIAGNOSIS — Z51.81 ENCOUNTER FOR THERAPEUTIC DRUG MONITORING: Primary | ICD-10-CM

## 2021-11-19 DIAGNOSIS — G47.33 OSA (OBSTRUCTIVE SLEEP APNEA): ICD-10-CM

## 2021-11-19 DIAGNOSIS — R73.01 IFG (IMPAIRED FASTING GLUCOSE): ICD-10-CM

## 2021-11-19 DIAGNOSIS — F31.9 BIPOLAR DISEASE, CHRONIC (HCC): ICD-10-CM

## 2021-11-19 PROCEDURE — 99213 OFFICE O/P EST LOW 20 MIN: CPT | Performed by: PHYSICIAN ASSISTANT

## 2021-11-19 NOTE — PROGRESS NOTES
This visit is conducted using Telemedicine with live, interactive video and audio. Patient has been referred to the [facility] website at [email or web address] to review the yearly Consent to Treat document.     Patient understands and accepts financial res speaking fluently and in clear sentences    Lab Results   Component Value Date    WBC 5.9 05/12/2021    RBC 5.95 (H) 05/12/2021    HGB 16.4 05/12/2021    HCT 50.5 05/12/2021    MCV 84.9 05/12/2021    MCH 27.6 05/12/2021    MCHC 32.5 05/12/2021    RDW 13.4 Amphetamine-Dextroamphet ER 20 MG Oral Capsule SR 24 Hr, Take 20 mg by mouth every morning., Disp: , Rfl:     No current facility-administered medications on file prior to visit.       ASSESSMENT  Analyzed weight data:       Diagnoses and all orders for t communication. This has been done in good elaine to provide continuity of care in the best interest of the provider-patient relationship, due to the ongoing public health crisis/national emergency and because of restrictions of visitation.   There are limit

## 2021-11-30 NOTE — TELEPHONE ENCOUNTER
I called insurance company for peer to peer. Spoke to Dr Sanket Oliveira who approved MRI brain, please call pt and MRI brain needs to be done before June 12 (expiration date). #[id number]  Valid until June 12, 2021.     He also needs to follow up with sleep medicine Home

## 2022-01-04 RX ORDER — SEMAGLUTIDE 1.34 MG/ML
INJECTION, SOLUTION SUBCUTANEOUS
Qty: 9 ML | Refills: 0 | Status: SHIPPED | OUTPATIENT
Start: 2022-01-04

## 2022-01-04 NOTE — TELEPHONE ENCOUNTER
Requesting   Requested Prescriptions     Pending Prescriptions Disp Refills   • OZEMPIC, 1 MG/DOSE, 4 MG/3ML Subcutaneous Solution Pen-injector [Pharmacy Med Name: OZEMPIC 1MG PER DOSE (1X4MG PEN)] 9 mL 0     Sig: INJECT 1MG INTO THE SKIN ONCE A WEEK.

## 2022-01-05 RX ORDER — PROPRANOLOL HYDROCHLORIDE 80 MG/1
CAPSULE, EXTENDED RELEASE ORAL
Qty: 30 CAPSULE | Refills: 0 | Status: SHIPPED | OUTPATIENT
Start: 2022-01-05

## 2022-01-07 RX ORDER — PROPRANOLOL HYDROCHLORIDE 80 MG/1
CAPSULE, EXTENDED RELEASE ORAL
Qty: 90 CAPSULE | Refills: 0 | OUTPATIENT
Start: 2022-01-07

## 2022-02-04 ENCOUNTER — APPOINTMENT (OUTPATIENT)
Dept: CT IMAGING | Facility: HOSPITAL | Age: 42
End: 2022-02-04
Attending: EMERGENCY MEDICINE
Payer: COMMERCIAL

## 2022-02-04 ENCOUNTER — HOSPITAL ENCOUNTER (EMERGENCY)
Facility: HOSPITAL | Age: 42
Discharge: HOME | End: 2022-02-04
Attending: EMERGENCY MEDICINE
Payer: COMMERCIAL

## 2022-02-04 VITALS
RESPIRATION RATE: 15 BRPM | HEART RATE: 75 BPM | DIASTOLIC BLOOD PRESSURE: 82 MMHG | WEIGHT: 315 LBS | BODY MASS INDEX: 40.43 KG/M2 | TEMPERATURE: 98 F | OXYGEN SATURATION: 96 % | HEIGHT: 74 IN | SYSTOLIC BLOOD PRESSURE: 142 MMHG

## 2022-02-04 DIAGNOSIS — N20.0 KIDNEY STONE: Primary | ICD-10-CM

## 2022-02-04 LAB
ALBUMIN SERPL-MCNC: 3.9 G/DL (ref 3.4–5)
ALBUMIN/GLOB SERPL: 1 {RATIO} (ref 1–2)
ALP LIVER SERPL-CCNC: 115 U/L
ALT SERPL-CCNC: 56 U/L
ANION GAP SERPL CALC-SCNC: 4 MMOL/L (ref 0–18)
AST SERPL-CCNC: 26 U/L (ref 15–37)
BASOPHILS # BLD AUTO: 0.04 X10(3) UL (ref 0–0.2)
BASOPHILS NFR BLD AUTO: 0.5 %
BILIRUB SERPL-MCNC: 0.5 MG/DL (ref 0.1–2)
BILIRUB UR QL STRIP.AUTO: NEGATIVE
BUN BLD-MCNC: 16 MG/DL (ref 7–18)
CALCIUM BLD-MCNC: 9.1 MG/DL (ref 8.5–10.1)
CHLORIDE SERPL-SCNC: 107 MMOL/L (ref 98–112)
CO2 SERPL-SCNC: 26 MMOL/L (ref 21–32)
CREAT BLD-MCNC: 0.97 MG/DL
EOSINOPHIL # BLD AUTO: 0.06 X10(3) UL (ref 0–0.7)
EOSINOPHIL NFR BLD AUTO: 0.8 %
ERYTHROCYTE [DISTWIDTH] IN BLOOD BY AUTOMATED COUNT: 13.1 %
GLOBULIN PLAS-MCNC: 4 G/DL (ref 2.8–4.4)
GLUCOSE BLD-MCNC: 121 MG/DL (ref 70–99)
GLUCOSE UR STRIP.AUTO-MCNC: NEGATIVE MG/DL
HCT VFR BLD AUTO: 45.9 %
HGB BLD-MCNC: 15.1 G/DL
IMM GRANULOCYTES # BLD AUTO: 0.03 X10(3) UL (ref 0–1)
IMM GRANULOCYTES NFR BLD: 0.4 %
KETONES UR STRIP.AUTO-MCNC: NEGATIVE MG/DL
LEUKOCYTE ESTERASE UR QL STRIP.AUTO: NEGATIVE
LIPASE SERPL-CCNC: 99 U/L (ref 73–393)
LYMPHOCYTES # BLD AUTO: 2.29 X10(3) UL (ref 1–4)
LYMPHOCYTES NFR BLD AUTO: 30.3 %
MCH RBC QN AUTO: 27.5 PG (ref 26–34)
MCHC RBC AUTO-ENTMCNC: 32.9 G/DL (ref 31–37)
MCV RBC AUTO: 83.5 FL
MONOCYTES # BLD AUTO: 0.74 X10(3) UL (ref 0.1–1)
MONOCYTES NFR BLD AUTO: 9.8 %
NEUTROPHILS # BLD AUTO: 4.41 X10 (3) UL (ref 1.5–7.7)
NEUTROPHILS # BLD AUTO: 4.41 X10(3) UL (ref 1.5–7.7)
NEUTROPHILS NFR BLD AUTO: 58.2 %
NITRITE UR QL STRIP.AUTO: NEGATIVE
OSMOLALITY SERPL CALC.SUM OF ELEC: 286 MOSM/KG (ref 275–295)
PH UR STRIP.AUTO: 6 [PH] (ref 5–8)
PLATELET # BLD AUTO: 277 10(3)UL (ref 150–450)
POTASSIUM SERPL-SCNC: 4.1 MMOL/L (ref 3.5–5.1)
PROT SERPL-MCNC: 7.9 G/DL (ref 6.4–8.2)
PROT UR STRIP.AUTO-MCNC: 100 MG/DL
RBC # BLD AUTO: 5.5 X10(6)UL
RBC #/AREA URNS AUTO: >10 /HPF
SODIUM SERPL-SCNC: 137 MMOL/L (ref 136–145)
SP GR UR STRIP.AUTO: 1.01 (ref 1–1.03)
UROBILINOGEN UR STRIP.AUTO-MCNC: <2 MG/DL
WBC # BLD AUTO: 7.6 X10(3) UL (ref 4–11)

## 2022-02-04 PROCEDURE — 74176 CT ABD & PELVIS W/O CONTRAST: CPT | Performed by: EMERGENCY MEDICINE

## 2022-02-04 PROCEDURE — 85025 COMPLETE CBC W/AUTO DIFF WBC: CPT

## 2022-02-04 PROCEDURE — 99284 EMERGENCY DEPT VISIT MOD MDM: CPT

## 2022-02-04 PROCEDURE — 96375 TX/PRO/DX INJ NEW DRUG ADDON: CPT

## 2022-02-04 PROCEDURE — 85025 COMPLETE CBC W/AUTO DIFF WBC: CPT | Performed by: EMERGENCY MEDICINE

## 2022-02-04 PROCEDURE — 80053 COMPREHEN METABOLIC PANEL: CPT

## 2022-02-04 PROCEDURE — 96361 HYDRATE IV INFUSION ADD-ON: CPT

## 2022-02-04 PROCEDURE — 80053 COMPREHEN METABOLIC PANEL: CPT | Performed by: EMERGENCY MEDICINE

## 2022-02-04 PROCEDURE — 83690 ASSAY OF LIPASE: CPT | Performed by: EMERGENCY MEDICINE

## 2022-02-04 PROCEDURE — 83690 ASSAY OF LIPASE: CPT

## 2022-02-04 PROCEDURE — 81001 URINALYSIS AUTO W/SCOPE: CPT | Performed by: EMERGENCY MEDICINE

## 2022-02-04 PROCEDURE — 96374 THER/PROPH/DIAG INJ IV PUSH: CPT

## 2022-02-04 RX ORDER — HYDROCODONE BITARTRATE AND ACETAMINOPHEN 5; 325 MG/1; MG/1
1-2 TABLET ORAL EVERY 6 HOURS PRN
Qty: 10 TABLET | Refills: 0 | Status: SHIPPED | OUTPATIENT
Start: 2022-02-04 | End: 2022-02-09

## 2022-02-04 RX ORDER — KETOROLAC TROMETHAMINE 30 MG/ML
15 INJECTION, SOLUTION INTRAMUSCULAR; INTRAVENOUS ONCE
Status: COMPLETED | OUTPATIENT
Start: 2022-02-04 | End: 2022-02-04

## 2022-02-04 RX ORDER — HYDROMORPHONE HYDROCHLORIDE 1 MG/ML
INJECTION, SOLUTION INTRAMUSCULAR; INTRAVENOUS; SUBCUTANEOUS
Status: COMPLETED
Start: 2022-02-04 | End: 2022-02-04

## 2022-02-04 RX ORDER — TAMSULOSIN HYDROCHLORIDE 0.4 MG/1
0.4 CAPSULE ORAL DAILY
Qty: 7 CAPSULE | Refills: 0 | Status: SHIPPED | OUTPATIENT
Start: 2022-02-04 | End: 2022-02-11

## 2022-02-04 RX ORDER — ONDANSETRON 2 MG/ML
4 INJECTION INTRAMUSCULAR; INTRAVENOUS ONCE
Status: COMPLETED | OUTPATIENT
Start: 2022-02-04 | End: 2022-02-04

## 2022-02-04 RX ORDER — HYDROMORPHONE HYDROCHLORIDE 1 MG/ML
0.5 INJECTION, SOLUTION INTRAMUSCULAR; INTRAVENOUS; SUBCUTANEOUS ONCE
Status: COMPLETED | OUTPATIENT
Start: 2022-02-04 | End: 2022-02-04

## 2022-02-14 RX ORDER — PROPRANOLOL HYDROCHLORIDE 80 MG/1
CAPSULE, EXTENDED RELEASE ORAL
Qty: 90 CAPSULE | Refills: 0 | Status: SHIPPED | OUTPATIENT
Start: 2022-02-14

## 2022-04-06 ENCOUNTER — OFFICE VISIT (OUTPATIENT)
Dept: FAMILY MEDICINE CLINIC | Facility: CLINIC | Age: 42
End: 2022-04-06
Payer: COMMERCIAL

## 2022-04-06 VITALS
BODY MASS INDEX: 40.43 KG/M2 | HEIGHT: 74 IN | RESPIRATION RATE: 16 BRPM | DIASTOLIC BLOOD PRESSURE: 82 MMHG | HEART RATE: 77 BPM | WEIGHT: 315 LBS | OXYGEN SATURATION: 98 % | SYSTOLIC BLOOD PRESSURE: 128 MMHG

## 2022-04-06 DIAGNOSIS — M54.6 CHRONIC MIDLINE THORACIC BACK PAIN: ICD-10-CM

## 2022-04-06 DIAGNOSIS — Z00.00 ROUTINE GENERAL MEDICAL EXAMINATION AT A HEALTH CARE FACILITY: Primary | ICD-10-CM

## 2022-04-06 DIAGNOSIS — Z23 NEED FOR DIPHTHERIA-TETANUS-PERTUSSIS (TDAP) VACCINE: ICD-10-CM

## 2022-04-06 DIAGNOSIS — G89.29 CHRONIC MIDLINE THORACIC BACK PAIN: ICD-10-CM

## 2022-04-06 DIAGNOSIS — G43.709 CHRONIC MIGRAINE WITHOUT AURA WITHOUT STATUS MIGRAINOSUS, NOT INTRACTABLE: ICD-10-CM

## 2022-04-06 PROCEDURE — 3008F BODY MASS INDEX DOCD: CPT | Performed by: FAMILY MEDICINE

## 2022-04-06 PROCEDURE — 3079F DIAST BP 80-89 MM HG: CPT | Performed by: FAMILY MEDICINE

## 2022-04-06 PROCEDURE — 3074F SYST BP LT 130 MM HG: CPT | Performed by: FAMILY MEDICINE

## 2022-04-06 PROCEDURE — 90715 TDAP VACCINE 7 YRS/> IM: CPT | Performed by: FAMILY MEDICINE

## 2022-04-06 PROCEDURE — 90471 IMMUNIZATION ADMIN: CPT | Performed by: FAMILY MEDICINE

## 2022-04-06 PROCEDURE — 99396 PREV VISIT EST AGE 40-64: CPT | Performed by: FAMILY MEDICINE

## 2022-04-06 RX ORDER — PROPRANOLOL HYDROCHLORIDE 80 MG/1
1 CAPSULE, EXTENDED RELEASE ORAL DAILY
Qty: 90 CAPSULE | Refills: 3 | Status: SHIPPED | OUTPATIENT
Start: 2022-05-12

## 2022-04-28 NOTE — TELEPHONE ENCOUNTER
Requesting ozempic 1 mg  LOV: 11/19/21  RTC: not noted  Last Relevant Labs: 4/17/21  Filled: 1/4/22 #9ml with 0 refills    No future appointments.

## 2022-05-13 RX ORDER — SEMAGLUTIDE 1.34 MG/ML
INJECTION, SOLUTION SUBCUTANEOUS
Qty: 9 ML | Refills: 0 | OUTPATIENT
Start: 2022-05-13

## 2022-05-13 RX ORDER — SEMAGLUTIDE 1.34 MG/ML
INJECTION, SOLUTION SUBCUTANEOUS
Qty: 9 ML | Refills: 0 | Status: SHIPPED | OUTPATIENT
Start: 2022-05-13

## 2022-05-13 NOTE — TELEPHONE ENCOUNTER
Duplicate sent to [facility] today.   Denied this duplicate with note to schedule

## 2022-05-13 NOTE — TELEPHONE ENCOUNTER
Patient read the message he is overdue for an appointment and did not schedule.   He is not diabetic

## 2022-05-31 ENCOUNTER — APPOINTMENT (OUTPATIENT)
Dept: CT IMAGING | Facility: HOSPITAL | Age: 42
End: 2022-05-31
Attending: EMERGENCY MEDICINE
Payer: COMMERCIAL

## 2022-05-31 ENCOUNTER — HOSPITAL ENCOUNTER (EMERGENCY)
Facility: HOSPITAL | Age: 42
Discharge: HOME | End: 2022-05-31
Attending: EMERGENCY MEDICINE
Payer: COMMERCIAL

## 2022-05-31 ENCOUNTER — APPOINTMENT (OUTPATIENT)
Dept: ULTRASOUND IMAGING | Facility: HOSPITAL | Age: 42
End: 2022-05-31
Attending: EMERGENCY MEDICINE
Payer: COMMERCIAL

## 2022-05-31 ENCOUNTER — HOSPITAL ENCOUNTER (OUTPATIENT)
Age: 42
Discharge: ANOTHER HOSPITAL | End: 2022-05-31
Payer: COMMERCIAL

## 2022-05-31 VITALS
WEIGHT: 315 LBS | DIASTOLIC BLOOD PRESSURE: 96 MMHG | TEMPERATURE: 98 F | HEART RATE: 75 BPM | SYSTOLIC BLOOD PRESSURE: 146 MMHG | BODY MASS INDEX: 41 KG/M2 | OXYGEN SATURATION: 97 % | RESPIRATION RATE: 17 BRPM

## 2022-05-31 VITALS
RESPIRATION RATE: 18 BRPM | HEART RATE: 83 BPM | WEIGHT: 315 LBS | OXYGEN SATURATION: 96 % | DIASTOLIC BLOOD PRESSURE: 99 MMHG | BODY MASS INDEX: 40.43 KG/M2 | TEMPERATURE: 98 F | SYSTOLIC BLOOD PRESSURE: 137 MMHG | HEIGHT: 74 IN

## 2022-05-31 DIAGNOSIS — R10.10 UPPER ABDOMINAL PAIN: ICD-10-CM

## 2022-05-31 DIAGNOSIS — R11.2 NAUSEA VOMITING AND DIARRHEA: Primary | ICD-10-CM

## 2022-05-31 DIAGNOSIS — R19.7 NAUSEA VOMITING AND DIARRHEA: Primary | ICD-10-CM

## 2022-05-31 DIAGNOSIS — A09 ACUTE INFECTIVE GASTROENTERITIS: Primary | ICD-10-CM

## 2022-05-31 LAB
#MXD IC: 0.4 X10ˆ3/UL (ref 0.1–1)
ALBUMIN SERPL-MCNC: 4.1 G/DL (ref 3.4–5)
ALBUMIN/GLOB SERPL: 0.9 {RATIO} (ref 1–2)
ALP LIVER SERPL-CCNC: 127 U/L
ANION GAP SERPL CALC-SCNC: 3 MMOL/L (ref 0–18)
ATRIAL RATE: 76 BPM
BASOPHILS # BLD AUTO: 0.04 X10(3) UL (ref 0–0.2)
BASOPHILS NFR BLD AUTO: 0.5 %
BILIRUB SERPL-MCNC: 0.5 MG/DL (ref 0.1–2)
BILIRUB UR QL STRIP.AUTO: NEGATIVE
BUN BLD-MCNC: 13 MG/DL (ref 7–18)
BUN BLD-MCNC: 18 MG/DL (ref 7–18)
CALCIUM BLD-MCNC: 9.4 MG/DL (ref 8.5–10.1)
CHLORIDE BLD-SCNC: 107 MMOL/L (ref 98–112)
CHLORIDE SERPL-SCNC: 108 MMOL/L (ref 98–112)
CO2 BLD-SCNC: 25 MMOL/L (ref 21–32)
CO2 SERPL-SCNC: 23 MMOL/L (ref 21–32)
COLOR UR AUTO: YELLOW
CREAT BLD-MCNC: 0.8 MG/DL
CREAT BLD-MCNC: 0.95 MG/DL
EOSINOPHIL # BLD AUTO: 0.02 X10(3) UL (ref 0–0.7)
EOSINOPHIL NFR BLD AUTO: 0.2 %
ERYTHROCYTE [DISTWIDTH] IN BLOOD BY AUTOMATED COUNT: 14.3 %
GLOBULIN PLAS-MCNC: 4.4 G/DL (ref 2.8–4.4)
GLUCOSE BLD-MCNC: 100 MG/DL (ref 70–99)
GLUCOSE BLD-MCNC: 117 MG/DL (ref 70–99)
GLUCOSE UR STRIP.AUTO-MCNC: NEGATIVE MG/DL
HCT VFR BLD AUTO: 50.5 %
HCT VFR BLD AUTO: 52.8 %
HCT VFR BLD CALC: 53 %
HGB BLD-MCNC: 16.9 G/DL
HGB BLD-MCNC: 17.1 G/DL
IMM GRANULOCYTES # BLD AUTO: 0.04 X10(3) UL (ref 0–1)
IMM GRANULOCYTES NFR BLD: 0.5 %
ISTAT IONIZED CALCIUM FOR CHEM 8: 1.25 MMOL/L (ref 1.12–1.32)
KETONES UR STRIP.AUTO-MCNC: NEGATIVE MG/DL
LEUKOCYTE ESTERASE UR QL STRIP.AUTO: NEGATIVE
LIPASE SERPL-CCNC: 28 U/L (ref 73–393)
LYMPHOCYTES # BLD AUTO: 1.5 X10ˆ3/UL (ref 1–4)
LYMPHOCYTES # BLD AUTO: 1.54 X10(3) UL (ref 1–4)
LYMPHOCYTES NFR BLD AUTO: 16.7 %
LYMPHOCYTES NFR BLD AUTO: 18.3 %
MCH RBC QN AUTO: 27.3 PG (ref 26–34)
MCH RBC QN AUTO: 28.1 PG (ref 26–34)
MCHC RBC AUTO-ENTMCNC: 32.4 G/DL (ref 31–37)
MCHC RBC AUTO-ENTMCNC: 33.5 G/DL (ref 31–37)
MCV RBC AUTO: 83.9 FL
MCV RBC AUTO: 84.3 FL (ref 80–100)
MIXED CELL %: 4.8 %
MONOCYTES # BLD AUTO: 0.39 X10(3) UL (ref 0.1–1)
MONOCYTES NFR BLD AUTO: 4.6 %
NEUTROPHILS # BLD AUTO: 6.4 X10 (3) UL (ref 1.5–7.7)
NEUTROPHILS # BLD AUTO: 6.4 X10(3) UL (ref 1.5–7.7)
NEUTROPHILS # BLD AUTO: 6.9 X10ˆ3/UL (ref 1.5–7.7)
NEUTROPHILS NFR BLD AUTO: 75.9 %
NEUTROPHILS NFR BLD AUTO: 78.5 %
NITRITE UR QL STRIP.AUTO: NEGATIVE
OSMOLALITY SERPL CALC.SUM OF ELEC: 278 MOSM/KG (ref 275–295)
P AXIS: 15 DEGREES
P-R INTERVAL: 164 MS
PH UR STRIP.AUTO: 5 [PH] (ref 5–8)
PLATELET # BLD AUTO: 276 X10ˆ3/UL (ref 150–450)
PLATELET # BLD AUTO: 313 10(3)UL (ref 150–450)
POTASSIUM BLD-SCNC: 4.9 MMOL/L (ref 3.6–5.1)
PROT SERPL-MCNC: 8.5 G/DL (ref 6.4–8.2)
PROT UR STRIP.AUTO-MCNC: 100 MG/DL
Q-T INTERVAL: 366 MS
QRS DURATION: 92 MS
QTC CALCULATION (BEZET): 411 MS
R AXIS: 7 DEGREES
RBC # BLD AUTO: 6.02 X10(6)UL
RBC # BLD AUTO: 6.26 X10ˆ6/UL
RBC UR QL AUTO: NEGATIVE
SARS-COV-2 RNA RESP QL NAA+PROBE: NOT DETECTED
SODIUM BLD-SCNC: 139 MMOL/L (ref 136–145)
SODIUM SERPL-SCNC: 134 MMOL/L (ref 136–145)
SP GR UR STRIP.AUTO: 1.03 (ref 1–1.03)
T AXIS: 20 DEGREES
UROBILINOGEN UR STRIP.AUTO-MCNC: <2 MG/DL
VENTRICULAR RATE: 76 BPM
WBC # BLD AUTO: 8.4 X10(3) UL (ref 4–11)
WBC # BLD AUTO: 8.8 X10ˆ3/UL (ref 4–11)

## 2022-05-31 PROCEDURE — 80053 COMPREHEN METABOLIC PANEL: CPT | Performed by: EMERGENCY MEDICINE

## 2022-05-31 PROCEDURE — 99215 OFFICE O/P EST HI 40 MIN: CPT | Performed by: NURSE PRACTITIONER

## 2022-05-31 PROCEDURE — 83690 ASSAY OF LIPASE: CPT | Performed by: EMERGENCY MEDICINE

## 2022-05-31 PROCEDURE — 76700 US EXAM ABDOM COMPLETE: CPT | Performed by: EMERGENCY MEDICINE

## 2022-05-31 PROCEDURE — 99285 EMERGENCY DEPT VISIT HI MDM: CPT

## 2022-05-31 PROCEDURE — 81001 URINALYSIS AUTO W/SCOPE: CPT | Performed by: EMERGENCY MEDICINE

## 2022-05-31 PROCEDURE — U0002 COVID-19 LAB TEST NON-CDC: HCPCS | Performed by: NURSE PRACTITIONER

## 2022-05-31 PROCEDURE — 99284 EMERGENCY DEPT VISIT MOD MDM: CPT

## 2022-05-31 PROCEDURE — 80047 BASIC METABLC PNL IONIZED CA: CPT | Performed by: NURSE PRACTITIONER

## 2022-05-31 PROCEDURE — 96375 TX/PRO/DX INJ NEW DRUG ADDON: CPT

## 2022-05-31 PROCEDURE — 93000 ELECTROCARDIOGRAM COMPLETE: CPT | Performed by: NURSE PRACTITIONER

## 2022-05-31 PROCEDURE — 93010 ELECTROCARDIOGRAM REPORT: CPT

## 2022-05-31 PROCEDURE — 74177 CT ABD & PELVIS W/CONTRAST: CPT | Performed by: EMERGENCY MEDICINE

## 2022-05-31 PROCEDURE — 85025 COMPLETE CBC W/AUTO DIFF WBC: CPT | Performed by: NURSE PRACTITIONER

## 2022-05-31 PROCEDURE — 96374 THER/PROPH/DIAG INJ IV PUSH: CPT

## 2022-05-31 RX ORDER — DICYCLOMINE HCL 20 MG
20 TABLET ORAL 4 TIMES DAILY PRN
Qty: 15 TABLET | Refills: 0 | Status: SHIPPED | OUTPATIENT
Start: 2022-05-31 | End: 2022-06-02

## 2022-05-31 RX ORDER — HYDROMORPHONE HYDROCHLORIDE 1 MG/ML
1 INJECTION, SOLUTION INTRAMUSCULAR; INTRAVENOUS; SUBCUTANEOUS ONCE
Status: COMPLETED | OUTPATIENT
Start: 2022-05-31 | End: 2022-05-31

## 2022-05-31 RX ORDER — ONDANSETRON 4 MG/1
4 TABLET, ORALLY DISINTEGRATING ORAL EVERY 4 HOURS PRN
Qty: 10 TABLET | Refills: 0 | Status: SHIPPED | OUTPATIENT
Start: 2022-05-31 | End: 2022-06-07

## 2022-05-31 RX ORDER — SODIUM CHLORIDE 9 MG/ML
INJECTION, SOLUTION INTRAVENOUS ONCE
Status: COMPLETED | OUTPATIENT
Start: 2022-05-31 | End: 2022-05-31

## 2022-05-31 RX ORDER — ONDANSETRON 2 MG/ML
4 INJECTION INTRAMUSCULAR; INTRAVENOUS ONCE
Status: COMPLETED | OUTPATIENT
Start: 2022-05-31 | End: 2022-05-31

## 2022-05-31 RX ORDER — IOHEXOL 350 MG/ML
100 INJECTION, SOLUTION INTRAVENOUS
Status: COMPLETED | OUTPATIENT
Start: 2022-05-31 | End: 2022-05-31

## 2022-05-31 RX ORDER — KETOROLAC TROMETHAMINE 30 MG/ML
15 INJECTION, SOLUTION INTRAMUSCULAR; INTRAVENOUS ONCE
Status: COMPLETED | OUTPATIENT
Start: 2022-05-31 | End: 2022-05-31

## 2022-05-31 NOTE — ED INITIAL ASSESSMENT (HPI)
Pt wife drove pt from [address]  for additional tests.   abd pain n/v /d x 3 days  Iv at right hand   See IC notes

## 2022-05-31 NOTE — ED INITIAL ASSESSMENT (HPI)
3 days, c/o burping acid, multiple episodes of vomiting and diarrhea since yesterday. C/o mid abdominal pain with bloating. Denies fevers. Also has lightheadedness.

## 2022-06-04 LAB
ATRIAL RATE: 77 BPM
P AXIS: -16 DEGREES
P-R INTERVAL: 166 MS
Q-T INTERVAL: 390 MS
QRS DURATION: 94 MS
QTC CALCULATION (BEZET): 441 MS
R AXIS: -22 DEGREES
T AXIS: -17 DEGREES
VENTRICULAR RATE: 77 BPM

## 2022-06-08 ENCOUNTER — TELEPHONE (OUTPATIENT)
Dept: PHYSICAL THERAPY | Facility: HOSPITAL | Age: 42
End: 2022-06-08

## 2022-06-10 ENCOUNTER — APPOINTMENT (OUTPATIENT)
Dept: PHYSICAL THERAPY | Facility: HOSPITAL | Age: 42
End: 2022-06-10
Attending: FAMILY MEDICINE
Payer: COMMERCIAL

## 2022-06-15 ENCOUNTER — OFFICE VISIT (OUTPATIENT)
Dept: PHYSICAL THERAPY | Facility: HOSPITAL | Age: 42
End: 2022-06-15
Attending: FAMILY MEDICINE
Payer: COMMERCIAL

## 2022-06-15 DIAGNOSIS — G89.29 CHRONIC MIDLINE THORACIC BACK PAIN: ICD-10-CM

## 2022-06-15 DIAGNOSIS — M54.6 CHRONIC MIDLINE THORACIC BACK PAIN: ICD-10-CM

## 2022-06-15 PROCEDURE — 97110 THERAPEUTIC EXERCISES: CPT

## 2022-06-15 PROCEDURE — 97162 PT EVAL MOD COMPLEX 30 MIN: CPT

## 2022-06-17 ENCOUNTER — OFFICE VISIT (OUTPATIENT)
Dept: PHYSICAL THERAPY | Facility: HOSPITAL | Age: 42
End: 2022-06-17
Attending: FAMILY MEDICINE
Payer: COMMERCIAL

## 2022-06-17 PROCEDURE — 97140 MANUAL THERAPY 1/> REGIONS: CPT

## 2022-06-17 PROCEDURE — 97110 THERAPEUTIC EXERCISES: CPT

## 2022-06-22 ENCOUNTER — OFFICE VISIT (OUTPATIENT)
Dept: PHYSICAL THERAPY | Facility: HOSPITAL | Age: 42
End: 2022-06-22
Attending: FAMILY MEDICINE
Payer: COMMERCIAL

## 2022-06-22 PROCEDURE — 97110 THERAPEUTIC EXERCISES: CPT

## 2022-06-22 PROCEDURE — 97140 MANUAL THERAPY 1/> REGIONS: CPT

## 2022-06-24 ENCOUNTER — OFFICE VISIT (OUTPATIENT)
Dept: PHYSICAL THERAPY | Facility: HOSPITAL | Age: 42
End: 2022-06-24
Attending: FAMILY MEDICINE
Payer: COMMERCIAL

## 2022-06-24 PROCEDURE — 97140 MANUAL THERAPY 1/> REGIONS: CPT

## 2022-06-24 PROCEDURE — 97110 THERAPEUTIC EXERCISES: CPT

## 2022-06-29 ENCOUNTER — TELEPHONE (OUTPATIENT)
Dept: PHYSICAL THERAPY | Facility: HOSPITAL | Age: 42
End: 2022-06-29

## 2022-06-29 ENCOUNTER — APPOINTMENT (OUTPATIENT)
Dept: PHYSICAL THERAPY | Facility: HOSPITAL | Age: 42
End: 2022-06-29
Attending: FAMILY MEDICINE
Payer: COMMERCIAL

## 2022-07-01 ENCOUNTER — OFFICE VISIT (OUTPATIENT)
Dept: PHYSICAL THERAPY | Facility: HOSPITAL | Age: 42
End: 2022-07-01
Attending: FAMILY MEDICINE
Payer: COMMERCIAL

## 2022-07-01 PROCEDURE — 97140 MANUAL THERAPY 1/> REGIONS: CPT

## 2022-07-01 PROCEDURE — 97110 THERAPEUTIC EXERCISES: CPT

## 2022-07-08 ENCOUNTER — OFFICE VISIT (OUTPATIENT)
Dept: PHYSICAL THERAPY | Facility: HOSPITAL | Age: 42
End: 2022-07-08
Attending: FAMILY MEDICINE
Payer: COMMERCIAL

## 2022-07-08 PROCEDURE — 97110 THERAPEUTIC EXERCISES: CPT

## 2022-07-08 PROCEDURE — 97140 MANUAL THERAPY 1/> REGIONS: CPT

## 2022-07-21 ENCOUNTER — TELEPHONE (OUTPATIENT)
Dept: INTERNAL MEDICINE CLINIC | Facility: CLINIC | Age: 42
End: 2022-07-21

## 2022-09-03 ENCOUNTER — HOSPITAL ENCOUNTER (OUTPATIENT)
Age: 42
Discharge: HOME | End: 2022-09-03
Payer: COMMERCIAL

## 2022-09-03 VITALS
DIASTOLIC BLOOD PRESSURE: 74 MMHG | WEIGHT: 315 LBS | OXYGEN SATURATION: 100 % | RESPIRATION RATE: 18 BRPM | BODY MASS INDEX: 39.17 KG/M2 | TEMPERATURE: 100 F | HEIGHT: 75 IN | HEART RATE: 89 BPM | SYSTOLIC BLOOD PRESSURE: 137 MMHG

## 2022-09-03 DIAGNOSIS — U07.1 COVID: Primary | ICD-10-CM

## 2022-09-03 LAB
POCT INFLUENZA A: NEGATIVE
POCT INFLUENZA B: NEGATIVE
S PYO AG THROAT QL: NEGATIVE
SARS-COV-2 RNA RESP QL NAA+PROBE: DETECTED

## 2022-09-03 RX ORDER — DIPHENHYDRAMINE HYDROCHLORIDE 50 MG/ML
25 INJECTION INTRAMUSCULAR; INTRAVENOUS ONCE
Status: COMPLETED | OUTPATIENT
Start: 2022-09-03 | End: 2022-09-03

## 2022-09-03 RX ORDER — KETOROLAC TROMETHAMINE 30 MG/ML
30 INJECTION, SOLUTION INTRAMUSCULAR; INTRAVENOUS ONCE
Status: COMPLETED | OUTPATIENT
Start: 2022-09-03 | End: 2022-09-03

## 2022-09-03 RX ORDER — METOCLOPRAMIDE HYDROCHLORIDE 5 MG/ML
10 INJECTION INTRAMUSCULAR; INTRAVENOUS ONCE
Status: COMPLETED | OUTPATIENT
Start: 2022-09-03 | End: 2022-09-03

## 2022-09-03 RX ORDER — SODIUM CHLORIDE 9 MG/ML
1000 INJECTION, SOLUTION INTRAVENOUS ONCE
Status: COMPLETED | OUTPATIENT
Start: 2022-09-03 | End: 2022-09-03

## 2022-09-03 RX ORDER — NIRMATRELVIR AND RITONAVIR 300-100 MG
KIT ORAL
Qty: 30 TABLET | Refills: 0 | Status: SHIPPED | OUTPATIENT
Start: 2022-09-03 | End: 2022-09-08

## 2022-09-08 ENCOUNTER — TELEMEDICINE (OUTPATIENT)
Dept: FAMILY MEDICINE CLINIC | Facility: CLINIC | Age: 42
End: 2022-09-08
Payer: COMMERCIAL

## 2022-09-08 DIAGNOSIS — J01.00 ACUTE NON-RECURRENT MAXILLARY SINUSITIS: ICD-10-CM

## 2022-09-08 DIAGNOSIS — U07.1 COVID-19 VIRUS INFECTION: Primary | ICD-10-CM

## 2022-09-08 DIAGNOSIS — H92.03 OTALGIA OF BOTH EARS: ICD-10-CM

## 2022-09-08 PROCEDURE — 99213 OFFICE O/P EST LOW 20 MIN: CPT | Performed by: PHYSICIAN ASSISTANT

## 2022-09-08 RX ORDER — CODEINE PHOSPHATE AND GUAIFENESIN 10; 100 MG/5ML; MG/5ML
SOLUTION ORAL EVERY 6 HOURS PRN
Qty: 236 ML | Refills: 0 | Status: SHIPPED | OUTPATIENT
Start: 2022-09-08

## 2022-09-08 RX ORDER — AMOXICILLIN AND CLAVULANATE POTASSIUM 875; 125 MG/1; MG/1
1 TABLET, FILM COATED ORAL 2 TIMES DAILY
Qty: 20 TABLET | Refills: 0 | Status: SHIPPED | OUTPATIENT
Start: 2022-09-08 | End: 2022-09-18

## 2022-11-28 ENCOUNTER — HOSPITAL ENCOUNTER (OUTPATIENT)
Age: 42
Discharge: HOME | End: 2022-11-28
Payer: COMMERCIAL

## 2022-11-28 VITALS
OXYGEN SATURATION: 96 % | TEMPERATURE: 99 F | SYSTOLIC BLOOD PRESSURE: 124 MMHG | BODY MASS INDEX: 40 KG/M2 | HEART RATE: 63 BPM | WEIGHT: 315 LBS | DIASTOLIC BLOOD PRESSURE: 84 MMHG | RESPIRATION RATE: 20 BRPM

## 2022-11-28 DIAGNOSIS — J06.9 UPPER RESPIRATORY TRACT INFECTION, UNSPECIFIED TYPE: Primary | ICD-10-CM

## 2022-11-28 DIAGNOSIS — J02.9 SORE THROAT: ICD-10-CM

## 2022-11-28 DIAGNOSIS — R09.82 PND (POST-NASAL DRIP): ICD-10-CM

## 2022-11-28 DIAGNOSIS — R68.83 CHILLS: ICD-10-CM

## 2022-11-28 LAB
POCT INFLUENZA A: NEGATIVE
POCT INFLUENZA B: NEGATIVE
S PYO AG THROAT QL: NEGATIVE
SARS-COV-2 RNA RESP QL NAA+PROBE: NOT DETECTED

## 2022-11-28 PROCEDURE — 87880 STREP A ASSAY W/OPTIC: CPT | Performed by: NURSE PRACTITIONER

## 2022-11-28 PROCEDURE — U0002 COVID-19 LAB TEST NON-CDC: HCPCS | Performed by: NURSE PRACTITIONER

## 2022-11-28 PROCEDURE — 87502 INFLUENZA DNA AMP PROBE: CPT | Performed by: NURSE PRACTITIONER

## 2022-11-28 PROCEDURE — 99213 OFFICE O/P EST LOW 20 MIN: CPT | Performed by: NURSE PRACTITIONER

## 2022-11-28 RX ORDER — PREDNISONE 20 MG/1
20 TABLET ORAL DAILY
Qty: 5 TABLET | Refills: 0 | Status: SHIPPED | OUTPATIENT
Start: 2022-11-28 | End: 2022-12-03

## 2022-11-28 RX ORDER — ALBUTEROL SULFATE 90 UG/1
2 AEROSOL, METERED RESPIRATORY (INHALATION) EVERY 4 HOURS PRN
Qty: 1 EACH | Refills: 0 | Status: SHIPPED | OUTPATIENT
Start: 2022-11-28 | End: 2022-12-28

## 2022-11-28 RX ORDER — BENZONATATE 100 MG/1
100 CAPSULE ORAL 3 TIMES DAILY PRN
Qty: 30 CAPSULE | Refills: 0 | Status: SHIPPED | OUTPATIENT
Start: 2022-11-28 | End: 2022-12-28

## 2022-11-29 NOTE — DISCHARGE INSTRUCTIONS
Increase your water intake 2 to 3 L a day to help keep secretions in a nice liquid state. Please follow-up with your primary care provider within 1 week.

## 2023-02-19 DIAGNOSIS — G43.709 CHRONIC MIGRAINE WITHOUT AURA WITHOUT STATUS MIGRAINOSUS, NOT INTRACTABLE: ICD-10-CM

## 2023-02-20 RX ORDER — PROPRANOLOL HYDROCHLORIDE 80 MG/1
CAPSULE, EXTENDED RELEASE ORAL
Qty: 90 CAPSULE | Refills: 3 | Status: SHIPPED | OUTPATIENT
Start: 2023-02-20

## 2023-03-15 ENCOUNTER — TELEPHONE (OUTPATIENT)
Dept: INTERNAL MEDICINE CLINIC | Facility: CLINIC | Age: 43
End: 2023-03-15

## 2023-03-15 ENCOUNTER — OFFICE VISIT (OUTPATIENT)
Dept: INTERNAL MEDICINE CLINIC | Facility: CLINIC | Age: 43
End: 2023-03-15
Payer: COMMERCIAL

## 2023-03-15 VITALS
WEIGHT: 315 LBS | OXYGEN SATURATION: 97 % | SYSTOLIC BLOOD PRESSURE: 134 MMHG | HEIGHT: 75 IN | RESPIRATION RATE: 16 BRPM | DIASTOLIC BLOOD PRESSURE: 88 MMHG | BODY MASS INDEX: 39.17 KG/M2 | HEART RATE: 73 BPM

## 2023-03-15 DIAGNOSIS — Z51.81 ENCOUNTER FOR THERAPEUTIC DRUG MONITORING: Primary | ICD-10-CM

## 2023-03-15 DIAGNOSIS — G47.33 OSA ON CPAP: ICD-10-CM

## 2023-03-15 DIAGNOSIS — Z99.89 OSA ON CPAP: ICD-10-CM

## 2023-03-15 DIAGNOSIS — E88.81 INSULIN RESISTANCE: ICD-10-CM

## 2023-03-15 DIAGNOSIS — E66.01 CLASS 3 SEVERE OBESITY WITH SERIOUS COMORBIDITY AND BODY MASS INDEX (BMI) OF 40.0 TO 44.9 IN ADULT, UNSPECIFIED OBESITY TYPE (HCC): ICD-10-CM

## 2023-03-15 DIAGNOSIS — F31.9 BIPOLAR DISEASE, CHRONIC (HCC): ICD-10-CM

## 2023-03-15 DIAGNOSIS — R19.7 DIARRHEA, UNSPECIFIED TYPE: ICD-10-CM

## 2023-03-15 DIAGNOSIS — E78.2 MIXED HYPERLIPIDEMIA: ICD-10-CM

## 2023-03-15 PROCEDURE — 99214 OFFICE O/P EST MOD 30 MIN: CPT | Performed by: NURSE PRACTITIONER

## 2023-03-15 PROCEDURE — 3079F DIAST BP 80-89 MM HG: CPT | Performed by: NURSE PRACTITIONER

## 2023-03-15 PROCEDURE — 3008F BODY MASS INDEX DOCD: CPT | Performed by: NURSE PRACTITIONER

## 2023-03-15 PROCEDURE — 3075F SYST BP GE 130 - 139MM HG: CPT | Performed by: NURSE PRACTITIONER

## 2023-03-15 RX ORDER — PANCRELIPASE LIPASE, PANCRELIPASE PROTEASE, PANCRELIPASE AMYLASE 40000; 126000; 168000 [USP'U]/1; [USP'U]/1; [USP'U]/1
CAPSULE, DELAYED RELEASE ORAL
Qty: 48 CAPSULE | Refills: 0 | COMMUNITY
Start: 2023-03-15

## 2023-03-15 RX ORDER — SEMAGLUTIDE 0.5 MG/.5ML
0.5 INJECTION, SOLUTION SUBCUTANEOUS WEEKLY
Qty: 2 ML | Refills: 0 | Status: SHIPPED | OUTPATIENT
Start: 2023-03-15 | End: 2023-04-06

## 2023-03-15 RX ORDER — CLONAZEPAM 1 MG/1
TABLET ORAL
COMMUNITY
Start: 2023-03-14

## 2023-03-15 NOTE — TELEPHONE ENCOUNTER
FORMF FILLED OUT AND FAXED . Notes not attached since its not complete.       PRIME  [id number]    Class 3 severe obesity with serious comorbidity and body mass index (BMI) of 40.0 to 44.9 in adult, unspecified obesity type (HCC)  E66.01, Z68.41    Mixed hyperlipidemia  E78.2    Insulin resistance  E88.81 Trig/HDL ratio 4.9   Bipolar disease, chronic (HCC)  F31.9    JUDY on CPAP  G47.33, Z99.89

## 2023-03-18 NOTE — TELEPHONE ENCOUNTER
Switch to Metformin  mg, si tab daily with food for 7 days, then increase to 2 tabs daily thereafter. #60 with 1 refill.

## 2023-03-21 RX ORDER — METFORMIN HYDROCHLORIDE 750 MG/1
TABLET, EXTENDED RELEASE ORAL
Qty: 60 TABLET | Refills: 1 | Status: SHIPPED | OUTPATIENT
Start: 2023-03-21

## 2023-03-22 RX ORDER — METFORMIN HYDROCHLORIDE 750 MG/1
TABLET, EXTENDED RELEASE ORAL
Qty: 180 TABLET | Refills: 0 | OUTPATIENT
Start: 2023-03-22

## 2023-03-22 NOTE — TELEPHONE ENCOUNTER
Denied pharmacy request to change metformin to 90 day as it is NOT required for insurance coverage. Asked to leave as approved yesterday.

## 2023-05-30 RX ORDER — METFORMIN HYDROCHLORIDE 750 MG/1
TABLET, EXTENDED RELEASE ORAL
Qty: 180 TABLET | Refills: 0 | OUTPATIENT
Start: 2023-05-30

## 2023-07-24 ENCOUNTER — HOSPITAL ENCOUNTER (OUTPATIENT)
Age: 43
Discharge: HOME | End: 2023-07-24
Payer: COMMERCIAL

## 2023-07-24 VITALS
DIASTOLIC BLOOD PRESSURE: 86 MMHG | TEMPERATURE: 97 F | RESPIRATION RATE: 18 BRPM | HEART RATE: 68 BPM | OXYGEN SATURATION: 98 % | SYSTOLIC BLOOD PRESSURE: 139 MMHG

## 2023-07-24 DIAGNOSIS — H92.03 PAINFUL EAR, BILATERAL: Primary | ICD-10-CM

## 2023-07-24 DIAGNOSIS — R03.0 ELEVATED BLOOD PRESSURE READING WITHOUT DIAGNOSIS OF HYPERTENSION: ICD-10-CM

## 2023-07-24 DIAGNOSIS — J01.40 ACUTE PANSINUSITIS, RECURRENCE NOT SPECIFIED: ICD-10-CM

## 2023-07-24 PROCEDURE — 99213 OFFICE O/P EST LOW 20 MIN: CPT | Performed by: PHYSICIAN ASSISTANT

## 2023-07-24 RX ORDER — PREDNISONE 20 MG/1
40 TABLET ORAL DAILY
Qty: 8 TABLET | Refills: 0 | Status: SHIPPED | OUTPATIENT
Start: 2023-07-24 | End: 2023-07-28

## 2023-07-24 RX ORDER — DEXAMETHASONE 4 MG/1
10 TABLET ORAL ONCE
Status: COMPLETED | OUTPATIENT
Start: 2023-07-24 | End: 2023-07-24

## 2023-07-24 RX ORDER — IBUPROFEN 600 MG/1
600 TABLET ORAL ONCE
Status: COMPLETED | OUTPATIENT
Start: 2023-07-24 | End: 2023-07-24

## 2023-07-24 RX ORDER — AMOXICILLIN 875 MG/1
875 TABLET, COATED ORAL 2 TIMES DAILY
Qty: 20 TABLET | Refills: 0 | Status: SHIPPED | OUTPATIENT
Start: 2023-07-24 | End: 2023-08-03

## 2023-07-24 NOTE — ED INITIAL ASSESSMENT (HPI)
Pt presents with bilateral ear pain and fullness. Pt reports on/off fevers. Pt was traveling in [facility], provided Zithromax and Cefixime, pt completed medications 2 days ago. Pt reports continued ear pain and fullness. Pt requesting test for Typhoid. He understands he may need to be tested at [facility].

## 2023-07-24 NOTE — DISCHARGE INSTRUCTIONS
Please return to the ER/clinic if symptoms worsen. Follow-up with your PCP in 24-48 hours as needed. The Decadron will work in your system the next several days. We will write for some additional prednisone to take on day 2 or 3 if symptoms persist.  The full course of antibiotics as prescribed in tandem with a probiotic daily if pain persist.  Definite probiotic some good over-the-counter brands are Culturelle, Florastor or align. Also recommend Marija yogurt. Drink plenty of fluids. Recommend an over-the-counter antihistamine daily i.e. Zyrtec. Close follow-up with your primary care physician for further evaluation and treatment. We will also given ENT for follow-up if symptoms persist.  Follow the Dash dietary plan and log your blood pressures at different intervals.

## 2023-08-16 RX ORDER — METFORMIN HYDROCHLORIDE 750 MG/1
1500 TABLET, EXTENDED RELEASE ORAL
Qty: 60 TABLET | Refills: 1 | OUTPATIENT
Start: 2023-08-16

## 2023-08-16 NOTE — TELEPHONE ENCOUNTER
Requesting   Requested Prescriptions     Pending Prescriptions Disp Refills    METFORMIN  MG Oral Tablet 24 Hr [Pharmacy Med Name: METFORMIN ER 750MG 24HR TABS] 60 tablet 1     Sig: TAKE 2 TABLETS(1500 MG) BY MOUTH DAILY WITH FOOD     LOV: 3/15/23  RTC: 2 months  Filled: 5/30/23 #60 with 1 refills    No future appointments.   Needs appt

## 2023-11-08 ENCOUNTER — OFFICE VISIT (OUTPATIENT)
Dept: INTERNAL MEDICINE CLINIC | Facility: CLINIC | Age: 43
End: 2023-11-08
Payer: COMMERCIAL

## 2023-11-08 VITALS
DIASTOLIC BLOOD PRESSURE: 82 MMHG | HEART RATE: 80 BPM | HEIGHT: 75 IN | BODY MASS INDEX: 39.17 KG/M2 | WEIGHT: 315 LBS | SYSTOLIC BLOOD PRESSURE: 122 MMHG | RESPIRATION RATE: 18 BRPM

## 2023-11-08 DIAGNOSIS — E88.819 INSULIN RESISTANCE: ICD-10-CM

## 2023-11-08 DIAGNOSIS — E66.01 CLASS 3 SEVERE OBESITY WITH SERIOUS COMORBIDITY AND BODY MASS INDEX (BMI) OF 40.0 TO 44.9 IN ADULT, UNSPECIFIED OBESITY TYPE (HCC): ICD-10-CM

## 2023-11-08 DIAGNOSIS — E78.2 MIXED HYPERLIPIDEMIA: ICD-10-CM

## 2023-11-08 DIAGNOSIS — Z51.81 ENCOUNTER FOR THERAPEUTIC DRUG MONITORING: Primary | ICD-10-CM

## 2023-11-08 PROCEDURE — 3079F DIAST BP 80-89 MM HG: CPT | Performed by: NURSE PRACTITIONER

## 2023-11-08 PROCEDURE — 99214 OFFICE O/P EST MOD 30 MIN: CPT | Performed by: NURSE PRACTITIONER

## 2023-11-08 PROCEDURE — 3008F BODY MASS INDEX DOCD: CPT | Performed by: NURSE PRACTITIONER

## 2023-11-08 PROCEDURE — 3074F SYST BP LT 130 MM HG: CPT | Performed by: NURSE PRACTITIONER

## 2023-11-08 RX ORDER — TIRZEPATIDE 2.5 MG/.5ML
2.5 INJECTION, SOLUTION SUBCUTANEOUS WEEKLY
Qty: 2 ML | Refills: 0 | COMMUNITY
Start: 2023-11-08

## 2023-11-10 PROBLEM — E78.2 MIXED HYPERLIPIDEMIA: Status: ACTIVE | Noted: 2023-11-10

## 2023-11-10 PROBLEM — E88.819 INSULIN RESISTANCE: Status: ACTIVE | Noted: 2023-11-10

## 2023-11-10 PROBLEM — Z51.81 ENCOUNTER FOR THERAPEUTIC DRUG MONITORING: Status: ACTIVE | Noted: 2023-11-10

## 2023-11-17 ENCOUNTER — PATIENT MESSAGE (OUTPATIENT)
Dept: INTERNAL MEDICINE CLINIC | Facility: CLINIC | Age: 43
End: 2023-11-17

## 2023-11-20 NOTE — TELEPHONE ENCOUNTER
From: Tricia Aguila  To: Gretchen Walker  Sent: 11/17/2023 2:48 PM CST  Subject: It works! You said message with update. I am not sick, nor feel any side effects except lowered appetite. I am planning my meals rather than snacking all day long. I am so happy, if at least for this moment. If you don't mind sending that prescription I would like to continue after these next two doses are complete. Thank you so, so very much for your kindness.

## 2023-11-22 RX ORDER — TIRZEPATIDE 5 MG/.5ML
5 INJECTION, SOLUTION SUBCUTANEOUS WEEKLY
Qty: 2 ML | Refills: 1 | Status: SHIPPED | OUTPATIENT
Start: 2023-11-22

## 2024-01-16 NOTE — TELEPHONE ENCOUNTER
Requesting   Requested Prescriptions     Pending Prescriptions Disp Refills    MOUNJARO 5 MG/0.5ML Subcutaneous Solution Pen-injector [Pharmacy Med Name: MOUNJARO 5 MG/0.5ML SUBCUTANEOUS SOLUTION PEN-INJECTOR] 2 mL 1     Sig: INJECT 5 MG INTO THE SKIN ONCE A WEEK.     LOV: 11/8/23  RTC: 3 months  Filled: 11/28/23 #2 with 1 refills    No future appointments.

## 2024-01-20 RX ORDER — TIRZEPATIDE 7.5 MG/.5ML
7.5 INJECTION, SOLUTION SUBCUTANEOUS WEEKLY
Qty: 2 ML | Refills: 0 | Status: SHIPPED | OUTPATIENT
Start: 2024-01-20

## 2024-01-20 NOTE — TELEPHONE ENCOUNTER
Sent increased dose at 7.5 mg weekly of Mounjaro. Please make sure to obtain patient current weight if asking about dose escalation. I need this information for medical decision making. In addition, see if he can check if Zepbound is covered this year. Last year no AOM coverage. This may provide a cost savings. Thanks

## 2024-02-14 DIAGNOSIS — E88.819 INSULIN RESISTANCE: ICD-10-CM

## 2024-02-14 DIAGNOSIS — Z51.81 ENCOUNTER FOR THERAPEUTIC DRUG MONITORING: Primary | ICD-10-CM

## 2024-02-14 DIAGNOSIS — E78.2 MIXED HYPERLIPIDEMIA: ICD-10-CM

## 2024-02-14 DIAGNOSIS — E66.01 CLASS 3 SEVERE OBESITY WITH SERIOUS COMORBIDITY AND BODY MASS INDEX (BMI) OF 40.0 TO 44.9 IN ADULT, UNSPECIFIED OBESITY TYPE (HCC): ICD-10-CM

## 2024-02-17 RX ORDER — TIRZEPATIDE 7.5 MG/.5ML
7.5 INJECTION, SOLUTION SUBCUTANEOUS WEEKLY
Qty: 2 ML | Refills: 1 | Status: SHIPPED | OUTPATIENT
Start: 2024-02-17

## 2024-02-23 ENCOUNTER — OFFICE VISIT (OUTPATIENT)
Dept: FAMILY MEDICINE CLINIC | Facility: CLINIC | Age: 44
End: 2024-02-23
Payer: COMMERCIAL

## 2024-02-23 ENCOUNTER — HOSPITAL ENCOUNTER (OUTPATIENT)
Dept: GENERAL RADIOLOGY | Age: 44
Discharge: HOME | End: 2024-02-23
Attending: NURSE PRACTITIONER
Payer: COMMERCIAL

## 2024-02-23 VITALS
SYSTOLIC BLOOD PRESSURE: 120 MMHG | HEART RATE: 74 BPM | WEIGHT: 308 LBS | RESPIRATION RATE: 16 BRPM | HEIGHT: 75 IN | OXYGEN SATURATION: 99 % | TEMPERATURE: 99 F | DIASTOLIC BLOOD PRESSURE: 62 MMHG | BODY MASS INDEX: 38.3 KG/M2

## 2024-02-23 DIAGNOSIS — J06.9 VIRAL URI WITH COUGH: Primary | ICD-10-CM

## 2024-02-23 DIAGNOSIS — J06.9 VIRAL URI WITH COUGH: ICD-10-CM

## 2024-02-23 LAB
OPERATOR ID: NORMAL
RAPID SARS-COV-2 BY PCR: NOT DETECTED

## 2024-02-23 PROCEDURE — 71046 X-RAY EXAM CHEST 2 VIEWS: CPT | Performed by: NURSE PRACTITIONER

## 2024-02-23 RX ORDER — BENZONATATE 200 MG/1
200 CAPSULE ORAL 3 TIMES DAILY PRN
Qty: 30 CAPSULE | Refills: 0 | Status: SHIPPED | OUTPATIENT
Start: 2024-02-23

## 2024-02-23 RX ORDER — ALBUTEROL SULFATE 90 UG/1
2 AEROSOL, METERED RESPIRATORY (INHALATION)
Qty: 1 EACH | Refills: 0 | Status: SHIPPED | OUTPATIENT
Start: 2024-02-23

## 2024-02-23 RX ORDER — ZOLPIDEM TARTRATE 10 MG/1
10 TABLET ORAL NIGHTLY PRN
COMMUNITY
Start: 2024-01-16 | End: 2024-02-23 | Stop reason: ALTCHOICE

## 2024-02-23 RX ORDER — PREDNISONE 20 MG/1
40 TABLET ORAL DAILY
Qty: 10 TABLET | Refills: 0 | Status: SHIPPED | OUTPATIENT
Start: 2024-02-23 | End: 2024-02-28

## 2024-02-23 NOTE — PROGRESS NOTES
CHIEF COMPLAINT:     Chief Complaint   Patient presents with    Sinus Problem     Sick since last Sat. Bad cough still, out of breath, and losing voice. - Entered by patient  lightheaded and dizzy, Covid at home test on Sunday was Neg         HPI:   Johan Solomon is a 43 year old male who presents for upper respiratory symptoms for  6 days. Patient reports sore throat, congestion, dry cough.  Associated symptoms include low grade fever - 100.s, greenish mucous, lost voice, chest hurts with coughing, hard to breath, occ dizzy/light headed.  Has hx of bronchitis and this feels similar.  Symptoms have been worsening since onset.  Treating symptoms with nyquil.   Home covid test 5 days ago was negative.    + hx of COVID - 1 yr ago; No COVID exposure.  1 Child home last week with similar symptoms.  No recent travel.  No large gatherings.      Other conditions:   BMI: Body mass index is 38.5 kg/m².      Current Outpatient Medications   Medication Sig Dispense Refill    clonazePAM 1 MG Oral Tab       PROPRANOLOL HCL ER 80 MG Oral Capsule SR 24 Hr TAKE 1 CAPSULE(80 MG) BY MOUTH DAILY 90 capsule 3    lamoTRIgine  MG Oral Tablet 24 Hr Take by mouth daily. Takes with 100mg for total dose 400mg      lamoTRIgine  MG Oral Tablet 24 Hr Take 1 tablet (100 mg total) by mouth daily.      Amphetamine-Dextroamphet ER 20 MG Oral Capsule SR 24 Hr Take 1 capsule (20 mg total) by mouth every morning.      Tirzepatide (MOUNJARO) 7.5 MG/0.5ML Subcutaneous Solution Pen-injector Inject 7.5 mg into the skin once a week. (Patient not taking: Reported on 2/23/2024) 2 mL 1    metFORMIN  MG Oral Tablet 24 Hr Take 2 tablets (1,500 mg total) by mouth daily with food. (Patient not taking: Reported on 2/23/2024) 60 tablet 1      Past Medical History:   Diagnosis Date    Anxiety state, unspecified     Back pain     COVID-19     Depression     Displacement of lumbar intervertebral disc without myelopathy 6/20/2013    Log Date:  08/06/2012 Fusion of L2-3     Internal hemorrhoids with complication 9/8/2020    Migraine     Migraines       Past Surgical History:   Procedure Laterality Date    ANKLE FRACTURE SURGERY      EXCIS LUMBAR DISK,ONE LEVEL      fusion L2-3    REVISE ULNAR NERVE AT ELBOW  2009    ulnar release to both arms.     SPINAL FUSION  2014         Social History     Socioeconomic History    Marital status:    Occupational History    Occupation: PROGRAMER     Employer: [employer]   Tobacco Use    Smoking status: Never    Smokeless tobacco: Never    Tobacco comments:     no longer smoking   Vaping Use    Vaping Use: Never used   Substance and Sexual Activity    Alcohol use: Yes     Comment: social 3x per week, now more rare    Drug use: Not Currently     Types: Cannabis     Comment: rare edible, tried 2x in lifetime   Other Topics Concern    Caffeine Concern Yes     Comment: 1x per daily coffee    Exercise No         REVIEW OF SYSTEMS:   GENERAL: feels well otherwise,  decreased appetite  SKIN: no rashes or abnormal skin lesions  HEENT: See HPI  LUNGS: See HPI  CARDIOVASCULAR: denies chest pain or palpitations   GI: denies N/V/C or abdominal pain  NEURO: denies headache.      EXAM:   /62   Pulse 74   Temp 99 °F (37.2 °C) (Oral)   Resp 16   Ht 6' 3\" (1.905 m)   Wt (!) 308 lb (139.7 kg)   SpO2 99%   BMI 38.50 kg/m²     Physical Exam  Constitutional:       General: He is not in acute distress.     Appearance: Normal appearance.   HENT:      Head: Normocephalic and atraumatic.      Right Ear: Tympanic membrane and ear canal normal.      Left Ear: Tympanic membrane and ear canal normal.      Nose: Congestion and rhinorrhea present. Rhinorrhea is clear.      Comments: No sinus tenderness.     Mouth/Throat:      Lips: Pink.      Mouth: Mucous membranes are moist.      Pharynx: Oropharynx is clear.      Comments: +pnd.  Eyes:      Extraocular Movements: Extraocular movements intact.      Conjunctiva/sclera:  Conjunctivae normal.   Cardiovascular:      Rate and Rhythm: Normal rate and regular rhythm.      Heart sounds: Normal heart sounds. No murmur heard.  Pulmonary:      Effort: Pulmonary effort is normal.      Breath sounds: Normal air entry. Examination of the right-upper field reveals rhonchi. Examination of the left-upper field reveals rhonchi. Rhonchi present. No wheezing or rales.   Musculoskeletal:      Cervical back: Normal range of motion and neck supple.   Lymphadenopathy:      Cervical: Cervical adenopathy (tender) present.      Right cervical: Superficial cervical adenopathy present. No posterior cervical adenopathy.     Left cervical: Superficial cervical adenopathy present. No posterior cervical adenopathy.   Skin:     General: Skin is warm and dry.      Findings: No rash.   Neurological:      General: No focal deficit present.      Mental Status: He is alert.          Recent Results (from the past 24 hour(s))   Rapid Covid-19    Collection Time: 02/23/24 12:43 PM    Specimen: Nares   Result Value Ref Range    Rapid SARS-CoV-2 by PCR Not Detected Not Detected    POCT Lot Number [id number]     POCT Expiration Date 8/28/2025     POCT Procedure Control Control Valid Control Valid     ,149        ASSESSMENT AND PLAN:   Johan Solomon is a 43 year old male who presents with     ASSESSMENT:   Encounter Diagnosis   Name Primary?    Viral URI with cough Yes       PLAN:   Chest XR ordered - wnl  Discussed likely viral etiology. Reviewed symptom relief measures with patient.   Monitor for worsening symptoms.  Comfort care as described in Patient Instructions  Medications as below.      Meds & Refills for this Visit:  Requested Prescriptions     Signed Prescriptions Disp Refills    albuterol 108 (90 Base) MCG/ACT Inhalation Aero Soln 1 each 0     Sig: Inhale 2 puffs into the lungs every 4 to 6 hours as needed for Wheezing.    benzonatate 200 MG Oral Cap 30 capsule 0     Sig: Take 1 capsule (200 mg  total) by mouth 3 (three) times daily as needed for cough.    predniSONE 20 MG Oral Tab 10 tablet 0     Sig: Take 2 tablets (40 mg total) by mouth daily for 5 days. Avoid taking at nighttime.       XR CHEST PA + LAT CHEST (CPT=71046)    Result Date: 2/23/2024  CONCLUSION: No acute cardiopulmonary abnormality.   LOCATION:  [facility]   Dictated by (CST): Silas Baker MD on 2/23/2024 at 12:50 PM     Finalized by (CST): Silas Baker MD on 2/23/2024 at 12:50 PM          Risks, benefits, and side effects of medication explained and discussed.  To f/u with PCP if sx do not resolve as anticipated.  The patient indicates understanding of these issues and agrees to the plan.        There are no Patient Instructions on file for this visit.

## 2024-02-23 NOTE — PATIENT INSTRUCTIONS
General comfort measures:  Get rest!  Hydrate! (cold or hot based on comfort). Drink lots of water or other non dehydrating liquids to help with illness. Salty foods, soups and tea can help with throat pain.   Hand washing-use hand  or wash hands frequently, cover your cough or sneeze, do not share towels or drinks with others.  Salt water gargles (1 tsp. Salt in 6 oz lukewarm water): gargle for 2 minutes, repeat every 15 minutes as needed to help decrease swelling and relieve pain.  Use humidified air, steamy showers/baths and use vaporizer in sleeping quarters to keep secretions thin.  Avoid smoking.    Symptom management:    Nasal congestion/Post-nasal-drip: Saline nasal spray to nostrils to help remove drainage or an antihistamine to help dry up drainage.    Sinus congestion/Post-nasal-drip: OTC Nasacort or Flonase (steroid nasal spray) nightly for 2 weeks.   Pain/discomfort:  May use Tylenol or Ibuprofen, if not contraindicated.  Sore throat:  Cepacol lozenges or Chloroseptic throat spray (active ingredient Benzocaine).      Follow up with your PCP in 1-2 weeks if not better.  Follow up in a few days if worsening symptoms. Seek immediate care if inability to swallow or breathe.

## 2024-02-28 ENCOUNTER — OFFICE VISIT (OUTPATIENT)
Dept: INTERNAL MEDICINE CLINIC | Facility: CLINIC | Age: 44
End: 2024-02-28
Payer: COMMERCIAL

## 2024-02-28 VITALS
HEIGHT: 75 IN | SYSTOLIC BLOOD PRESSURE: 132 MMHG | DIASTOLIC BLOOD PRESSURE: 90 MMHG | RESPIRATION RATE: 16 BRPM | HEART RATE: 74 BPM | WEIGHT: 311 LBS | BODY MASS INDEX: 38.67 KG/M2

## 2024-02-28 DIAGNOSIS — Z51.81 ENCOUNTER FOR THERAPEUTIC DRUG MONITORING: Primary | ICD-10-CM

## 2024-02-28 DIAGNOSIS — E66.01 CLASS 3 SEVERE OBESITY WITH SERIOUS COMORBIDITY AND BODY MASS INDEX (BMI) OF 40.0 TO 44.9 IN ADULT, UNSPECIFIED OBESITY TYPE (HCC): ICD-10-CM

## 2024-02-28 DIAGNOSIS — G47.33 OSA ON CPAP: ICD-10-CM

## 2024-02-28 DIAGNOSIS — E88.819 INSULIN RESISTANCE: ICD-10-CM

## 2024-02-28 DIAGNOSIS — E78.2 MIXED HYPERLIPIDEMIA: ICD-10-CM

## 2024-02-28 PROCEDURE — 3080F DIAST BP >= 90 MM HG: CPT | Performed by: NURSE PRACTITIONER

## 2024-02-28 PROCEDURE — 3008F BODY MASS INDEX DOCD: CPT | Performed by: NURSE PRACTITIONER

## 2024-02-28 PROCEDURE — 3075F SYST BP GE 130 - 139MM HG: CPT | Performed by: NURSE PRACTITIONER

## 2024-02-28 PROCEDURE — 99213 OFFICE O/P EST LOW 20 MIN: CPT | Performed by: NURSE PRACTITIONER

## 2024-02-28 RX ORDER — TIRZEPATIDE 7.5 MG/.5ML
7.5 INJECTION, SOLUTION SUBCUTANEOUS WEEKLY
Qty: 2 ML | Refills: 2 | Status: SHIPPED | OUTPATIENT
Start: 2024-02-28

## 2024-02-28 NOTE — PROGRESS NOTES
Johan Solomon is a 43 year old male presents today for f/u on medical weight loss program for the treatment of overweight, obesity, or morbid obesity with associated hyperlipidemia, insulin resistance, JUDY.    S:  Current weight   Wt Readings from Last 6 Encounters:   02/28/24 (!) 311 lb (141.1 kg)   02/23/24 (!) 308 lb (139.7 kg)   11/08/23 (!) 343 lb (155.6 kg)   03/15/23 (!) 334 lb (151.5 kg)   11/28/22 (!) 320 lb 1.7 oz (145.2 kg)   09/03/22 (!) 320 lb (145.2 kg)    AND BMI Body mass index is 38.87 kg/m²..    Patient has lost 32# since LOV 4 months ago. He has been consistent with medication, although missed a week and noticed the increase in appetite as well as triggers of stress which let to stress eating. Continues in counseling regularly. Exercise inconsistent d/t weather and plantar fascitis restrictions at present. Portions and appetite is notably smaller. Some recent sleep disruptions and constipation.    Testing/consult completed since LOV: Dietician: marlen     [facility]    Question 2/27/2024  4:04 PM CST - Filed by Patient   Please describe a success moment: Hitting a goalpost of 10% weight loss.   Please describe a challenging moment/needs for improvement: Dropping below 300. I can still stress eat at night if not careful.   Please complete this 24 hour food journal, listing everything you had to eat in the past day. Include the average time of day you ate these meals at    List foods, qty and prep for breakfast:    List foods, qty and prep for lunch.    List foods, qty and prep for dinner.    List foods, qty and prep for snacks.    List the types and qty of fluids consumed    On average, how many meals did you eat out per week?    Exercise    How many days per week are you active or exercise 2   On average, how many days were anaerobic (strength/resistance) exercises performed? 0   On average, how many days were aerobic (cardio) exercises performed? 2   Perceived level of  exertion on a scale of 1-5, with 5 being very intense: 3   Stress    Average stress level on a scale of 1-10, with 10 being extremely stressed: 7   If greater than 5/1O how would you grade your coping mechanisms? fair   Sleep hours and integrity    How many hours of uninterrupted sleep do you get a night: 7- using CPAP   Do you feel rested in the morning: Yes   If no, what may have been disrupting your sleep?    Please list any goal(s) for your next visit Good health and body response to such a quick loss and low caloric intake.     Social hx and PMH reviewed. Employed from home, sedentary.  with 3 children. Good spouse support. Hx of PTSD.    REVIEW OF SYSTEMS:  GENERAL: feels well otherwise  LUNGS: denies shortness of breath with exertion. Reports recent URI- finishing up prednisone.   CARDIOVASCULAR: denies chest pain on exertion, denies palpitations or pedal edema  GI: denies abdominal pain.  No N/V/D, see above  MUSCULOSKELETAL: chronic bilateral knee pain and LBP  NEURO: denies headaches  PSYCH: denies change in behavior or mood, denies feeling sad or depressed. Answers submitted by the patient for this visit:  Medical Weight Loss Follow Up (Submitted on 2/27/2024)  If greater than 5/1O how would you grade your coping mechanisms?: fair      EXAM:  /90   Pulse 74   Resp 16   Ht 6' 3\" (1.905 m)   Wt (!) 311 lb (141.1 kg)   BMI 38.87 kg/m²   GENERAL: well developed, well nourished, in no apparent distress, obese  EYES: conjunctiva pink, sclera non icteric, PERRLA  LUNGS: Breathing non labored. Mild right posterior expiratory wheeze.  CARDIO: RRR without murmur, normal S1 and S2 without clicks or gallops, no pedal edema.  GI: +BS, soft, non tender  NEURO/MS: motor and sensory grossly intact  PSYCH: pleasant, cooperative, normal mood and affect    ASSESSMENT AND PLAN:  Reviewed Initial Weight Data and Goal Weight Loss:       Encounter Diagnoses   Name Primary?    Encounter for therapeutic drug  monitoring Yes    Class 3 severe obesity with serious comorbidity and body mass index (BMI) of 40.0 to 44.9 in adult, unspecified obesity type (HCC)     Mixed hyperlipidemia     Insulin resistance     JUDY on CPAP          No orders of the defined types were placed in this encounter.      Meds & Refills for this Visit:  Requested Prescriptions     Signed Prescriptions Disp Refills    Tirzepatide-Weight Management (ZEPBOUND) 7.5 MG/0.5ML Subcutaneous Solution Auto-injector 2 mL 2     Sig: Inject 7.5 mg into the skin once a week.       Imaging & Consults:  None      Plan:  Patient has lost 32# since LOV 4 months ago on off label Mounjaro 7.5 mg weekly with a total weight loss of 19# since initial consult on 6/9/2021 with initial weight of 330#.  Weight loss goal: lose weight to help reduce LBP. Trial switch to Zepbound at same dose to see if savings, otherwise will continue to pay OOP. Hx of Ozempic with GI SEs. Hx of Metformin ER. No AOM coverage.  comfort foods and constipation prevention. See patient instructions below for additional plans and patient counseling.      Patient Instructions   Continue making lifestyle changes that focus on good nutrition, regular exercise and stress management.    Medication Plan: Continue tirzepatide 7.5 mg weekly with trial of switching from Mounjaro to Zepbound to see if better cost, if not can continue to pay out of pocket for lowest cost option. Can add over the counter Magnesium 250 mg nightly with increase to 500 mg max dose for constipation and sleep support.    Next steps to work on before next office visit include: Great work with mindful practices and identifying triggers and coping patterns. Continue with counseling.    Comfort Foods - Why do they make us happy?    by Nicolasa Tijerina, PhD, HSPP    [facility] @ www.obesityaction.org Summer 2013 Resources    “There, there. Just let me bake you some cookies.” “Don’t cry. We’ll stop and get some ice cream, and it will all be  better.”    Do these sound familiar? Maybe statements that you heard as a young child? They were innocent words and very genuine actions on the part of our caregivers to express love and concern to us when we were hurting. The way they knew to do this was through food - and not just any food. Usually, the foods offered were foods rich in fats and carbohydrates - the foods that we have come to term “comfort foods.” In this way, food has come to be used as a special type of medicine, as an anti-depressant of types, to cure the mood that ails us. However, such patterns can become very problematic, especially if it is a habitual pattern causing excessive weight gain.    Scientists continue to research the effect that the chemical composition of foods may have on our moods. While such research is very valuable, I want to focus on the psychology, not the biology, of comfort food.    Food and Customs  All cultures have customs around food. In my childhood, money was scarce and when there was some kind of special occasion, it meant that we would use limited resources to buy special foods. It meant that we were being treated in a special way. Birthdays meant choosing a special meal and a type of cake and ice cream (within a budget). Funerals involved having food brought to the bereaved. The  ritual is one of special interest to this topic. The message is quite obvious: “I hope this food makes you happier.” Again, nothing is ill-intended and the gift is given with much love and care. However, it is another reinforcement of the use of food to make us “feel better.”    We are given messages early in our lives and then reinforced throughout our lives about how food can make us feel different, to feel better. Because we equate food with happiness, we continue to turn to food for such comfort. And we do feel happy or better, albeit temporarily.    Changing Patterns  1. The key to changing this lifelong pattern of equating  food with happiness is to first be aware. Take some time to reflect on how food was used through your life and its connection to emotional states for you.    2. Next, take some time to reflect on your own emotional states. You may keep a feeling journal and write down how you felt each day. In reflecting, you will be more aware of the connection of food to your feelings in the past and more aware of your feelings in the present.    3. Then, the work begins. Take each emotion connected to food and create a list of other things you may do to tend to that emotion. For instance, you may have “sadness” as one emotion that has been connected to eating. Alternative ways to get comfort when sad may be:    Talk to a friend  Cry  Journal your feelings  Listen to music  Write a song or a poem    4. By creating alternatives, you begin to see how you can break the cycle of comfort eating.    5. Post this list of alternatives in a place that you are likely to see it regularly. Consult it. Add to it as needed. Or dorcas things off that you have tried that maybe didn’t work.    Food and Behavioral Conditioning  One important means of understanding the connection between food and behavior is understanding how we are conditioned to have a certain response when we are exposed repeatedly to a stimuli. In this case, when we have been told repeatedly that we can feel better with food (the stimuli), we believe that we do indeed feel better (the response) when we eat cakes and cookies and such. However, what we don’t think about is the other stimulus, the care, the concern, the love that came with the food and how that made us feel. In other words, we may have attributed our response (feeling better) to the wrong stimulus (food) rather than the one that actually did make us feel better, which was the love we felt.    So maybe it is not the food that makes us feel happy. Maybe it is the memory of these people expressing their love and care to  us. Maybe that is what really makes us happy. Patients often tell me that they eat when sad, lonely or bored. They are seeking comfort.    They want to feel “full” or “satisfied” and the food does offer that physical release. However, the true comfort that they seek cannot be found in carbohydrates or fat, but it can be found in the feeling of belonging, of connecting with others, of being creative and inspired.    Conclusion  Remember, you have had a lifetime of creating a pattern of using comfort foods, so it is not likely to change quickly. Make sure to give yourself some time to make these changes. When you are able to change the relationship with food, you are able to change your relationships with others, and you just might find more satisfying and healthier relationships.    Constipation  Constipation is a common and often uncomfortable problem. Constipation means you have bowel movements fewer than 3 times per week, or strain to pass hard, dry stool. It can last a short time. Or it can be a problem that never seems to go away. The good news is that it can often be treated and controlled.  Eat more fiber  One of the best ways to help treat constipation is to increase your fiber intake. You can do this either through diet or by using fiber supplements. Fiber (in whole grains, fruits, and vegetables) adds bulk and absorbs water to soften the stool. This helps the stool pass through the colon more easily. When you increase your fiber intake, do it slowly to avoid side effects such as bloating. Also increase the amount of water that you drink. Eating more of the following foods can add fiber to your diet.  High-fiber cereals  Whole grains, bran, and brown rice  Vegetables such as carrots, broccoli, and greens  Fresh fruits (especially apples, pears, and dried fruits like raisins and apricots)  Nuts and legumes (especially beans such as lentils, kidney beans, and lima beans)  Get physically active  Exercise helps  improve the working of your colon which helps ease constipation. Try to get some physical activity every day. If you haven’t been active for a while, talk to your healthcare provider before starting again.    © 8340-2189 [employer]. [address] [place] [address], [place] [address]. All rights reserved. This information is not intended as a substitute for professional medical care. Always follow your healthcare professional's instructions.        Medication use and SEs reviewed with patient.    Return in about 4 months (around 6/28/2024) for weight management via clinic or VV.    Patient verbalizes understanding.    DOCUMENTATION OF TIME SPENT: Code selection for this visit was based on time spent : 25 minutes on date of service in preparing to see the patient, obtaining and/or reviewing separately obtained history, performing a medically appropriate examination, counseling and educating the patient/family/caregiver, ordering medications or testing, referring and communicating with other healthcare providers, documenting clinical information in the electronic medical record, independently interpreting results and communicating results to the patient/family/caregiver and care coordination with the patient's other providers.

## 2024-02-28 NOTE — PATIENT INSTRUCTIONS
Continue making lifestyle changes that focus on good nutrition, regular exercise and stress management.    Medication Plan: Continue tirzepatide 7.5 mg weekly with trial of switching from Mounjaro to Zepbound to see if better cost, if not can continue to pay out of pocket for lowest cost option. Can add over the counter Magnesium 250 mg nightly with increase to 500 mg max dose for constipation and sleep support.    Next steps to work on before next office visit include: Great work with mindful practices and identifying triggers and coping patterns. Continue with counseling.    Comfort Foods - Why do they make us happy?    by Nicolasa Tijerina, PhD, HSPP    OAC @ www.obesityaction.org Summer 2013 Resources    “There, there. Just let me bake you some cookies.” “Don’t cry. We’ll stop and get some ice cream, and it will all be better.”    Do these sound familiar? Maybe statements that you heard as a young child? They were innocent words and very genuine actions on the part of our caregivers to express love and concern to us when we were hurting. The way they knew to do this was through food - and not just any food. Usually, the foods offered were foods rich in fats and carbohydrates - the foods that we have come to term “comfort foods.” In this way, food has come to be used as a special type of medicine, as an anti-depressant of types, to cure the mood that ails us. However, such patterns can become very problematic, especially if it is a habitual pattern causing excessive weight gain.    Scientists continue to research the effect that the chemical composition of foods may have on our moods. While such research is very valuable, I want to focus on the psychology, not the biology, of comfort food.    Food and Customs  All cultures have customs around food. In my childhood, money was scarce and when there was some kind of special occasion, it meant that we would use limited resources to buy special foods. It meant that we  were being treated in a special way. Birthdays meant choosing a special meal and a type of cake and ice cream (within a budget). Funerals involved having food brought to the bereaved. The  ritual is one of special interest to this topic. The message is quite obvious: “I hope this food makes you happier.” Again, nothing is ill-intended and the gift is given with much love and care. However, it is another reinforcement of the use of food to make us “feel better.”    We are given messages early in our lives and then reinforced throughout our lives about how food can make us feel different, to feel better. Because we equate food with happiness, we continue to turn to food for such comfort. And we do feel happy or better, albeit temporarily.    Changing Patterns  1. The key to changing this lifelong pattern of equating food with happiness is to first be aware. Take some time to reflect on how food was used through your life and its connection to emotional states for you.    2. Next, take some time to reflect on your own emotional states. You may keep a feeling journal and write down how you felt each day. In reflecting, you will be more aware of the connection of food to your feelings in the past and more aware of your feelings in the present.    3. Then, the work begins. Take each emotion connected to food and create a list of other things you may do to tend to that emotion. For instance, you may have “sadness” as one emotion that has been connected to eating. Alternative ways to get comfort when sad may be:    Talk to a friend  Cry  Journal your feelings  Listen to music  Write a song or a poem    4. By creating alternatives, you begin to see how you can break the cycle of comfort eating.    5. Post this list of alternatives in a place that you are likely to see it regularly. Consult it. Add to it as needed. Or dorcas things off that you have tried that maybe didn’t work.    Food and Behavioral Conditioning  One  important means of understanding the connection between food and behavior is understanding how we are conditioned to have a certain response when we are exposed repeatedly to a stimuli. In this case, when we have been told repeatedly that we can feel better with food (the stimuli), we believe that we do indeed feel better (the response) when we eat cakes and cookies and such. However, what we don’t think about is the other stimulus, the care, the concern, the love that came with the food and how that made us feel. In other words, we may have attributed our response (feeling better) to the wrong stimulus (food) rather than the one that actually did make us feel better, which was the love we felt.    So maybe it is not the food that makes us feel happy. Maybe it is the memory of these people expressing their love and care to us. Maybe that is what really makes us happy. Patients often tell me that they eat when sad, lonely or bored. They are seeking comfort.    They want to feel “full” or “satisfied” and the food does offer that physical release. However, the true comfort that they seek cannot be found in carbohydrates or fat, but it can be found in the feeling of belonging, of connecting with others, of being creative and inspired.    Conclusion  Remember, you have had a lifetime of creating a pattern of using comfort foods, so it is not likely to change quickly. Make sure to give yourself some time to make these changes. When you are able to change the relationship with food, you are able to change your relationships with others, and you just might find more satisfying and healthier relationships.    Constipation  Constipation is a common and often uncomfortable problem. Constipation means you have bowel movements fewer than 3 times per week, or strain to pass hard, dry stool. It can last a short time. Or it can be a problem that never seems to go away. The good news is that it can often be treated and  controlled.  Eat more fiber  One of the best ways to help treat constipation is to increase your fiber intake. You can do this either through diet or by using fiber supplements. Fiber (in whole grains, fruits, and vegetables) adds bulk and absorbs water to soften the stool. This helps the stool pass through the colon more easily. When you increase your fiber intake, do it slowly to avoid side effects such as bloating. Also increase the amount of water that you drink. Eating more of the following foods can add fiber to your diet.  High-fiber cereals  Whole grains, bran, and brown rice  Vegetables such as carrots, broccoli, and greens  Fresh fruits (especially apples, pears, and dried fruits like raisins and apricots)  Nuts and legumes (especially beans such as lentils, kidney beans, and lima beans)  Get physically active  Exercise helps improve the working of your colon which helps ease constipation. Try to get some physical activity every day. If you haven’t been active for a while, talk to your healthcare provider before starting again.    © 5527-6830 [employer]. [address] [place] [address], [place] [address]. All rights reserved. This information is not intended as a substitute for professional medical care. Always follow your healthcare professional's instructions.

## 2024-03-01 ENCOUNTER — OFFICE VISIT (OUTPATIENT)
Dept: FAMILY MEDICINE CLINIC | Facility: CLINIC | Age: 44
End: 2024-03-01
Payer: COMMERCIAL

## 2024-03-01 VITALS
DIASTOLIC BLOOD PRESSURE: 84 MMHG | BODY MASS INDEX: 38.67 KG/M2 | HEIGHT: 75 IN | SYSTOLIC BLOOD PRESSURE: 130 MMHG | WEIGHT: 311 LBS | HEART RATE: 84 BPM | RESPIRATION RATE: 20 BRPM | OXYGEN SATURATION: 97 %

## 2024-03-01 DIAGNOSIS — R05.9 COUGH, UNSPECIFIED TYPE: ICD-10-CM

## 2024-03-01 DIAGNOSIS — J02.9 SORE THROAT: Primary | ICD-10-CM

## 2024-03-01 LAB
CONTROL LINE PRESENT WITH A CLEAR BACKGROUND (YES/NO): YES YES/NO
KIT LOT #: 7023 NUMERIC

## 2024-03-01 PROCEDURE — 99214 OFFICE O/P EST MOD 30 MIN: CPT | Performed by: PHYSICIAN ASSISTANT

## 2024-03-01 PROCEDURE — 3008F BODY MASS INDEX DOCD: CPT | Performed by: PHYSICIAN ASSISTANT

## 2024-03-01 PROCEDURE — 3079F DIAST BP 80-89 MM HG: CPT | Performed by: PHYSICIAN ASSISTANT

## 2024-03-01 PROCEDURE — 3075F SYST BP GE 130 - 139MM HG: CPT | Performed by: PHYSICIAN ASSISTANT

## 2024-03-01 PROCEDURE — 87880 STREP A ASSAY W/OPTIC: CPT | Performed by: PHYSICIAN ASSISTANT

## 2024-03-01 RX ORDER — CEFDINIR 300 MG/1
300 CAPSULE ORAL 2 TIMES DAILY
Qty: 14 CAPSULE | Refills: 0 | Status: SHIPPED | OUTPATIENT
Start: 2024-03-01 | End: 2024-03-08

## 2024-03-01 RX ORDER — FLUTICASONE PROPIONATE AND SALMETEROL XINAFOATE 230; 21 UG/1; UG/1
2 AEROSOL, METERED RESPIRATORY (INHALATION) 2 TIMES DAILY
Qty: 1 EACH | Refills: 0 | Status: SHIPPED | OUTPATIENT
Start: 2024-03-01

## 2024-03-01 RX ORDER — CODEINE PHOSPHATE AND GUAIFENESIN 10; 100 MG/5ML; MG/5ML
5 SOLUTION ORAL EVERY 6 HOURS PRN
Qty: 237 ML | Refills: 0 | Status: SHIPPED | OUTPATIENT
Start: 2024-03-01

## 2024-03-01 NOTE — PROGRESS NOTES
Subjective:   Patient ID: Johan Solomon is a 43 year old male.    HPI  Patient presents c/o URI symptoms for a few weeks  Not improving over time  Was seen at [facility] and tested negative for viruses  Since that time he has developed more ear pain and sore throat  Cough productive of dark green/brown sputum  Congestion persists  Breathing is difficult, out of breath very easily  Using inhaler some but forgets to, unsure if helping much  He is taking nyquil with some relief  Temp has been 99.5, up to 100      History/Other:   Review of Systems   Constitutional:  Positive for fatigue. Negative for chills, diaphoresis and fever.   HENT:  Positive for congestion, ear pain, postnasal drip, rhinorrhea, sinus pressure, sinus pain and sore throat. Negative for hearing loss.    Respiratory:  Positive for cough, chest tightness and shortness of breath. Negative for wheezing.    Cardiovascular:  Negative for chest pain and palpitations.   Musculoskeletal:  Negative for arthralgias and myalgias.   Neurological:  Negative for dizziness, weakness, light-headedness and headaches.   Psychiatric/Behavioral:  Negative for sleep disturbance.      Current Outpatient Medications   Medication Sig Dispense Refill    Tirzepatide-Weight Management (ZEPBOUND) 7.5 MG/0.5ML Subcutaneous Solution Auto-injector Inject 7.5 mg into the skin once a week. 2 mL 2    albuterol 108 (90 Base) MCG/ACT Inhalation Aero Soln Inhale 2 puffs into the lungs every 4 to 6 hours as needed for Wheezing. 1 each 0    benzonatate 200 MG Oral Cap Take 1 capsule (200 mg total) by mouth 3 (three) times daily as needed for cough. 30 capsule 0    clonazePAM 1 MG Oral Tab       PROPRANOLOL HCL ER 80 MG Oral Capsule SR 24 Hr TAKE 1 CAPSULE(80 MG) BY MOUTH DAILY 90 capsule 3    lamoTRIgine  MG Oral Tablet 24 Hr Take by mouth daily. Takes with 100mg for total dose 400mg      lamoTRIgine  MG Oral Tablet 24 Hr Take 1 tablet (100 mg total) by mouth daily.       Amphetamine-Dextroamphet ER 20 MG Oral Capsule SR 24 Hr Take 1 capsule (20 mg total) by mouth every morning.       Allergies:No Known Allergies    Objective:   Physical Exam  Vitals and nursing note reviewed.   Constitutional:       General: He is not in acute distress.     Appearance: He is well-developed.   HENT:      Head: Normocephalic and atraumatic.      Right Ear: External ear normal. A middle ear effusion is present. Tympanic membrane is erythematous.      Left Ear: External ear normal. A middle ear effusion is present.      Nose: Mucosal edema and rhinorrhea present.      Right Turbinates: Swollen.      Left Turbinates: Swollen.      Right Sinus: Maxillary sinus tenderness and frontal sinus tenderness present.      Left Sinus: Maxillary sinus tenderness and frontal sinus tenderness present.      Mouth/Throat:      Pharynx: Pharyngeal swelling and posterior oropharyngeal erythema present.   Eyes:      Conjunctiva/sclera: Conjunctivae normal.      Pupils: Pupils are equal, round, and reactive to light.   Cardiovascular:      Rate and Rhythm: Normal rate and regular rhythm.      Heart sounds: Normal heart sounds.   Pulmonary:      Effort: Pulmonary effort is normal.      Breath sounds: Normal breath sounds. Decreased air movement present. No wheezing or rales.   Musculoskeletal:      Cervical back: Normal range of motion and neck supple.   Lymphadenopathy:      Cervical: Cervical adenopathy present.   Skin:     General: Skin is warm.   Neurological:      Mental Status: He is alert.         Assessment & Plan:   1. Sore throat  - Rapid Strep  - cefdinir 300 MG Oral Cap; Take 1 capsule (300 mg total) by mouth 2 (two) times daily for 7 days.  Dispense: 14 capsule; Refill: 0    2. Cough, unspecified type  - fluticasone-salmeterol 230-21 MCG/ACT Inhalation Aerosol; Inhale 2 puffs into the lungs 2 (two) times daily.  Dispense: 1 each; Refill: 0  - guaiFENesin-codeine (CHERATUSSIN AC) 100-10 MG/5ML Oral Solution; Take  5 mL by mouth every 6 (six) hours as needed for cough.  Dispense: 237 mL; Refill: 0    Rapid strep test negative but he is developing right ear infection. Symptoms continue to progress. Will empirically tx with antibiotics as directed. Take with food and probiotics. Continue supportive care. Add advair as directed and rinse mouth after each use. Okay to continue rescue inhaler prn. Cheratussin AC prn but be cautious of drowsiness. Follow up if not soon better or if worsens.

## 2024-03-04 RX ORDER — TIRZEPATIDE 7.5 MG/.5ML
7.5 INJECTION, SOLUTION SUBCUTANEOUS WEEKLY
Qty: 2 ML | Refills: 0 | OUTPATIENT
Start: 2024-03-04

## 2024-05-06 DIAGNOSIS — E88.819 INSULIN RESISTANCE: ICD-10-CM

## 2024-05-06 DIAGNOSIS — Z51.81 ENCOUNTER FOR THERAPEUTIC DRUG MONITORING: ICD-10-CM

## 2024-05-06 DIAGNOSIS — E66.01 CLASS 3 SEVERE OBESITY WITH SERIOUS COMORBIDITY AND BODY MASS INDEX (BMI) OF 40.0 TO 44.9 IN ADULT, UNSPECIFIED OBESITY TYPE (HCC): ICD-10-CM

## 2024-05-06 DIAGNOSIS — G47.33 OSA ON CPAP: ICD-10-CM

## 2024-05-06 DIAGNOSIS — E78.2 MIXED HYPERLIPIDEMIA: ICD-10-CM

## 2024-05-06 NOTE — TELEPHONE ENCOUNTER
Requesting   Requested Prescriptions     Pending Prescriptions Disp Refills    Tirzepatide-Weight Management (ZEPBOUND) 7.5 MG/0.5ML Subcutaneous Solution Auto-injector 2 mL 2     Sig: Inject 7.5 mg into the skin once a week.     LOV: 2/28/24  RTC: 4 months  Filled: 2/28/24 #2 with 2 refills    Future Appointments   Date Time Provider Department Center   6/19/2024 11:40 AM Giana Peña APRN [facility]

## 2024-05-08 RX ORDER — TIRZEPATIDE 7.5 MG/.5ML
7.5 INJECTION, SOLUTION SUBCUTANEOUS WEEKLY
Qty: 2 ML | Refills: 2 | Status: SHIPPED | OUTPATIENT
Start: 2024-05-08

## 2024-05-14 ENCOUNTER — TELEPHONE (OUTPATIENT)
Dept: INTERNAL MEDICINE CLINIC | Facility: CLINIC | Age: 44
End: 2024-05-14

## 2024-05-16 NOTE — TELEPHONE ENCOUNTER
Denied. In last OV states he will pay out of pocket. Sending mychart to patient and sending denial to scan.

## 2024-06-19 ENCOUNTER — OFFICE VISIT (OUTPATIENT)
Dept: INTERNAL MEDICINE CLINIC | Facility: CLINIC | Age: 44
End: 2024-06-19

## 2024-06-19 VITALS
DIASTOLIC BLOOD PRESSURE: 84 MMHG | RESPIRATION RATE: 16 BRPM | BODY MASS INDEX: 36.8 KG/M2 | HEART RATE: 69 BPM | WEIGHT: 296 LBS | SYSTOLIC BLOOD PRESSURE: 118 MMHG | HEIGHT: 75 IN

## 2024-06-19 DIAGNOSIS — E66.01 CLASS 3 SEVERE OBESITY WITH SERIOUS COMORBIDITY AND BODY MASS INDEX (BMI) OF 40.0 TO 44.9 IN ADULT, UNSPECIFIED OBESITY TYPE (HCC): ICD-10-CM

## 2024-06-19 DIAGNOSIS — Z51.81 ENCOUNTER FOR THERAPEUTIC DRUG MONITORING: Primary | ICD-10-CM

## 2024-06-19 DIAGNOSIS — E88.819 INSULIN RESISTANCE: ICD-10-CM

## 2024-06-19 DIAGNOSIS — E78.2 MIXED HYPERLIPIDEMIA: ICD-10-CM

## 2024-06-19 DIAGNOSIS — G47.33 OSA ON CPAP: ICD-10-CM

## 2024-06-19 PROCEDURE — 3074F SYST BP LT 130 MM HG: CPT | Performed by: NURSE PRACTITIONER

## 2024-06-19 PROCEDURE — 3079F DIAST BP 80-89 MM HG: CPT | Performed by: NURSE PRACTITIONER

## 2024-06-19 PROCEDURE — 3008F BODY MASS INDEX DOCD: CPT | Performed by: NURSE PRACTITIONER

## 2024-06-19 PROCEDURE — 99213 OFFICE O/P EST LOW 20 MIN: CPT | Performed by: NURSE PRACTITIONER

## 2024-06-19 RX ORDER — TIRZEPATIDE 7.5 MG/.5ML
7.5 INJECTION, SOLUTION SUBCUTANEOUS WEEKLY
Qty: 2 ML | Refills: 3 | Status: SHIPPED | OUTPATIENT
Start: 2024-06-19

## 2024-06-19 NOTE — PATIENT INSTRUCTIONS
Continue making lifestyle changes that focus on good nutrition, regular exercise and stress management.    Medication Plan: Continue Zepbound at current dose. Option to increase if weight plateau develops.    Next steps to work on before next office visit include: Great work with navigating these last few months with travel! Some tips for future travel and establishing a walking program. Recommend using the CALM jason with walking in the AM, start at 10 minutes.    Staying Healthy While Traveling    by Lulu Cameron RD    OAC at www.obesityaction.org Summer 2023 Resource    It’s time to go on a vacation! Summertime is a popular season for travel, and vacations are a wonderful way to unwind, discover new places, and spend time with family and friends. Taking a break from your daily routine is really important. When you return, you’ll feel refreshed and ready for the next things you need to do! As you pack your bags and plan your trip, remember to stay focused on your health and wellness goals. With a bit of planning, it’s not hard to stay active, eat well, and have a great time during your vacation.    Do Your Research and Plan Ahead  Keeping yourself on track during a vacation might feel like an impossible challenge. However, with the right preparation, it’s entirely achievable. Before you leave, take some time to plan your vacation. Look for ways to include health and nutrition in your itinerary, such as trying new activities and foods. You’ll be pleasantly surprised to find that many places have great options for staying healthy.    Check your Nutrition  Instead of constantly eating sweets, fried foods and junk, seek out healthier alternatives. Choosing nutritious foods while traveling has many advantages. First, eating healthy will give you more energy, which is important for having a fun vacation. Second, sticking to your plan will give you a sense of accomplishment and satisfaction. Here are some tips to help you  choose wisely and prioritize your well-being.    Consider using grocery delivery services. You can shop online from home and have meals and snacks delivered to your hotel. Just make sure your room has a refrigerator. This way, you won’t have to search for a store and can avoid the temptation of unhealthy vacation treats. You can stock up on fresh produce, cheese sticks, popcorn and protein bars to keep in your hotel room.    Make eating out fun by finding new restaurants to try at your destination. Take a moment to look up their menus online and come up with a plan. You can plan to try fresh fish at a local beach restaurant, visit a steakhouse and choose a lean cut of meat with a side salad, or enjoy chicken fajitas at a Mexican restaurant.  Remember to give yourself a break. It’s okay to indulge in a special meal or snack during your vacation. One treat doesn’t mean the whole day is ruined. Just get back on track with your healthy eating at the next meal.    Finding Fitness  Discovering fun fitness activities can help you build a habit that you’ll want to continue. When you’re on vacation, make it a point to set aside time to move your body. This will not only increase your energy levels but also make you feel positive and satisfied about being active. Be creative and think outside the box to come up with exciting ideas to stay active during your vacation!    Try to find ways to move throughout the day. Look for trails in a local park, a gym at your hotel, or join an exercise class for the day. Keep things interesting by varying your activities and trying something new.  Involve the whole family in staying active. Rent bikes for an afternoon, plan a walk after dinner, or try something different like surfing! You can also let your kids choose an activity. You might be surprised to hear what ideas they have!    Mind Your Mental Health  Vacations are meant to be relaxing and a chance to recharge. Make sure you have a  vacation that helps you rest and rejuvenate! Sometimes it’s easy to plan too many activities and take on too many commitments. Take a moment to find balance between fun and fitness.    Pack items you enjoy. Bring your favorite books, puzzles, music, or whatever you love to ensure you have time to do just that. Sneaking away for a few minutes can give you time to spend on your favorite things.  Remember to rest. Vacations can be full of exciting things, but it’s also important to take it easy. Take an afternoon nap or find some quiet time alone to recharge.    Include activities that you enjoy. Vacations offer a lot of things to do, so make sure you have activities that you personally enjoy along with the rest of the family.    How to: Make it Through a Long Day of Travel  Whether you’re taking a road trip for a few hours or a plane across the [place] travel days require some planning. Here are some tips to make your trip easier.    Pack a snack bag for long road trips or airport days. When in the car, consider bringing a cooler with water to avoid sugary snacks at the gas stations. Pack fresh fruits, vegetables, water, cheese and meats. Include snacks like popcorn, pretzels or whole-grain crackers. Airline travel can be more challenging, but you can pack snacks such as protein bars, trail mix or popcorn in your carry-on. Bring a refillable water bottle to stay hydrated throughout the day!    Find ways to walk, even on your busiest travel day. If you’re driving, take quick 10-15-minute walks every couple of hours. It will not only give you some cardio exercise but also reduce stress and improve your mood during long car rides. If you’re stuck in an airport, walk up and down the terminal while you wait.  Bring items like books, adult coloring books or knitting supplies to keep busy and reduce screen time. This can be a great opportunity to explore a new hobby and let your creative juices flow.  How to: Make Healthy Food  Choices When Eating Out  Making healthy food choices while eating out on vacation can be challenging.    Instead of donuts and pastries for breakfast, go for high-protein items like eggs, turkey sausage, turkey aguayo, oats and whole-grain toast. Ask for fresh fruit or low-fat yogurt on the side to make breakfast even more nutritious.  Restaurant portions can be large. If someone in your group is willing to split a meal, that can be an easy solution. Since you might not have enough space to take leftovers with you, you may have to leave some behind.  When looking at the lunch or dinner menu, choose grilled, baked or boiled options. This can save you a lot of calories and fat. Be mindful of side dishes, as they can add up quickly. Consider swapping a high-calorie side item for a side salad, fruit or vegetables.    Desserts can be tempting, but plan ahead if you want to order one. Choose a lower-calorie meal to balance it out. You can also share the dessert with others at your table to enjoy a smaller portion.  How to: Deal With an unexpected change of plans  We’ve all been there. Your perfectly planned vacation goes sideways. How do you make the most of these days? It can be easy to throw in the towel, but always be ready with a backup plan.    The weather can change unexpectedly. A rainy day might ruin your beach or mountain hike plans. Take on the challenge and find new ways to have fun. Look for an indoor pool or an activity center to keep working towards your goals!    Your carefully chosen restaurant may not have healthy options, or your grocery store delivery might be late. Don’t give up; just adapt and make a new decision. Make the best choice you can and move on to the next meal. It’s okay if it’s not perfect!    Sometimes your perfectly planned day doesn’t go as expected. Kids may start fighting during a trip to the park, or someone might get sick on vacation. Focus on the positive moments and enjoy the time  you have.  Wherever you go on your summer vacation, make sure to enjoy the break, relaxation and adventure. Taking time away from your usual routine can be amazing, and keeping up with your health and fitness goals can make your vacation even better.    A Sample Walking Program  Experts recommend walking briskly on most days. Aim for a target of 30 minutes on most days, or 150 or more minutes a week. Walking programs can help you reach this goal by slowly increasing the frequency and the amount of  time you walk. Try this walking program:  First week  Walk 3 times a week.  Walk for 5 minutes each time.  Second week  Walk 3 times a week.  Walk for 10 minutes each time.  Third week  Walk 3 times a week.  Walk for 13 minutes each time.  Fourth week  Walk 3 times a week.  Walk for 15 minutes each time.  Fifth week  Walk 4 times a week.  Walk for 15 minutes each time.  Sixth week and beyond  Gradually increase your minutes of walking each time, and your number of times each week, until you reach 30 minutes, 5-7 days of the week.  Tips for getting the most from your walking program  Walk briskly. If you can sing, speed up. If you can’t talk easily, slow down.  Choose good walking shoes with padded soles and good arch support.  Don’t use hand or ankle weights. They can cause injuries.  Walk indoors if the weather is bad. Use a treadmill or walk inside a shopping mall  Before you start walking, check with your healthcare provider if you are new to exercise, over 40, overweight, or a smoker. Also check with your provider  if you have heart disease, high blood pressure, diabetes, arthritis, asthma, or any other health problem that concerns you. Your provider can help you get started and help you stay safe.   Date Last Reviewed: 8/13/2015  © 4606-5071 [employer]. [address] [place] [address], [place] [address]. All rights reserved. This information is not intended as a substitute for professional medical care. Always  follow your healthcare professional's instructions.

## 2024-06-19 NOTE — PROGRESS NOTES
Johan Solomon is a 44 year old male presents today for f/u on medical weight loss program for the treatment of overweight, obesity, or morbid obesity with associated hyperlipidemia, insulin resistance, JUDY.    S:  Current weight   Wt Readings from Last 6 Encounters:   06/19/24 296 lb (134.3 kg)   03/01/24 (!) 311 lb (141.1 kg)   02/28/24 (!) 311 lb (141.1 kg)   02/23/24 (!) 308 lb (139.7 kg)   11/08/23 (!) 343 lb (155.6 kg)   03/15/23 (!) 334 lb (151.5 kg)    AND BMI Body mass index is 37 kg/m²..    Patient has lost 15# since LOV 4 months ago. He has been consistent with medication and switch to Zepbound. Was off for a short period of time d/t supply. Traveling these past few months has gotten routine and schedule off a bit. Was happy to be able to use the regular seatbelt during most recent flight travel. Continues in regular counseling.    Testing/consult completed since LOV: Dietician: marlen     [facility] Medical Weight Loss Follow Up    Question 6/19/2024  8:03 AM CDT - Filed by Patient   Please describe a success moment: Eating half or quarter of sweets i was given.   Please describe a challenging moment/needs for improvement: Meals while traveling are hard to keep healthy. Especially when driving.   Please complete this 24 hour food journal, listing everything you had to eat in the past day. Include the average time of day you ate these meals at    List foods, qty and prep for breakfast:    List foods, qty and prep for lunch.    List foods, qty and prep for dinner. 2 eggs, Mixed veg, biscuit   List foods, qty and prep for snacks. Beef stick   List the types and qty of fluids consumed Lots of coffee. Water when thirsty.   On average, how many meals did you eat out per week? 2   Exercise    How many days per week are you active or exercise    On average, how many days were anaerobic (strength/resistance) exercises performed?    On average, how many days were aerobic (cardio) exercises performed?    Perceived level of  exertion on a scale of 1-5, with 5 being very intense:    Stress    Average stress level on a scale of 1-10, with 10 being extremely stressed: 4   If greater than 5/1O how would you grade your coping mechanisms?    Sleep hours and integrity    How many hours of uninterrupted sleep do you get a night: 5   Do you feel rested in the morning: No   If no, what may have been disrupting your sleep? Unsure. Hard to get to sleep.   Please list any goal(s) for your next visit Continuing losinf weight     Social hx and PMH reviewed. Employed from home, sedentary.  with 3 children. Good spouse support. Hx of PTSD.    REVIEW OF SYSTEMS:  GENERAL: feels well otherwise  LUNGS: denies shortness of breath with exertion  CARDIOVASCULAR: denies chest pain on exertion, denies palpitations or pedal edema  GI: denies abdominal pain.  No N/V/D/C.  MUSCULOSKELETAL: chronic knee pains reduced significantly  NEURO: denies headaches  PSYCH: denies change in behavior or mood, denies feeling sad or depressed.    EXAM:  /84   Pulse 69   Resp 16   Ht 6' 3\" (1.905 m)   Wt 296 lb (134.3 kg)   BMI 37.00 kg/m²   GENERAL: well developed, well nourished, in no apparent distress, obese  EYES: conjunctiva pink, sclera non icteric, PERRLA  LUNGS: Breathing non labored. CTA in all fields  CARDIO: RRR without murmur, normal S1 and S2 without clicks or gallops, no pedal edema.  GI: +BS  NEURO/MS: motor and sensory grossly intact  PSYCH: pleasant, cooperative, normal mood and affect    ASSESSMENT AND PLAN:  Reviewed Initial Weight Data and Goal Weight Loss:       Encounter Diagnoses   Name Primary?    Encounter for therapeutic drug monitoring Yes    Class 3 severe obesity with serious comorbidity and body mass index (BMI) of 40.0 to 44.9 in adult, unspecified obesity type (HCC)     Mixed hyperlipidemia     Insulin resistance     JUDY on CPAP            No orders of the defined types were placed in this encounter.      Meds & Refills for this  Visit:  Requested Prescriptions     Signed Prescriptions Disp Refills    Tirzepatide-Weight Management (ZEPBOUND) 7.5 MG/0.5ML Subcutaneous Solution Auto-injector 2 mL 3     Sig: Inject 7.5 mg into the skin once a week.       Imaging & Consults:  None      Plan:  Patient has lost 15# since LOV 4 months ago on Zepbound 7.5 mg weekly with a total weight loss of 34# since initial consult on 6/9/2021 with initial weight of 330#.  Weight loss goal: lose weight to help reduce LBP. CPM, patient is paying OOP. Hx of Ozempic with GI SEs. Hx of Metformin ER. No AOM coverage.  on fitness and travel tips. See patient instructions below for additional plans and patient counseling.      Patient Instructions   Continue making lifestyle changes that focus on good nutrition, regular exercise and stress management.    Medication Plan: Continue Zepbound at current dose. Option to increase if weight plateau develops.    Next steps to work on before next office visit include: Great work with navigating these last few months with travel! Some tips for future travel and establishing a walking program. Recommend using the CALM jason with walking in the AM, start at 10 minutes.    Staying Healthy While Traveling    by Lulu Cameron RD    OAC at www.obesityaction.org Summer 2023 Resource    It’s time to go on a vacation! Summertime is a popular season for travel, and vacations are a wonderful way to unwind, discover new places, and spend time with family and friends. Taking a break from your daily routine is really important. When you return, you’ll feel refreshed and ready for the next things you need to do! As you pack your bags and plan your trip, remember to stay focused on your health and wellness goals. With a bit of planning, it’s not hard to stay active, eat well, and have a great time during your vacation.    Do Your Research and Plan Ahead  Keeping yourself on track during a vacation might feel like an impossible challenge.  However, with the right preparation, it’s entirely achievable. Before you leave, take some time to plan your vacation. Look for ways to include health and nutrition in your itinerary, such as trying new activities and foods. You’ll be pleasantly surprised to find that many places have great options for staying healthy.    Check your Nutrition  Instead of constantly eating sweets, fried foods and junk, seek out healthier alternatives. Choosing nutritious foods while traveling has many advantages. First, eating healthy will give you more energy, which is important for having a fun vacation. Second, sticking to your plan will give you a sense of accomplishment and satisfaction. Here are some tips to help you choose wisely and prioritize your well-being.    Consider using grocery delivery services. You can shop online from home and have meals and snacks delivered to your hotel. Just make sure your room has a refrigerator. This way, you won’t have to search for a store and can avoid the temptation of unhealthy vacation treats. You can stock up on fresh produce, cheese sticks, popcorn and protein bars to keep in your hotel room.    Make eating out fun by finding new restaurants to try at your destination. Take a moment to look up their menus online and come up with a plan. You can plan to try fresh fish at a local beach restaurant, visit a steakhouse and choose a lean cut of meat with a side salad, or enjoy chicken fajitas at a Mexican restaurant.  Remember to give yourself a break. It’s okay to indulge in a special meal or snack during your vacation. One treat doesn’t mean the whole day is ruined. Just get back on track with your healthy eating at the next meal.    Finding Fitness  Discovering fun fitness activities can help you build a habit that you’ll want to continue. When you’re on vacation, make it a point to set aside time to move your body. This will not only increase your energy levels but also make you feel  positive and satisfied about being active. Be creative and think outside the box to come up with exciting ideas to stay active during your vacation!    Try to find ways to move throughout the day. Look for trails in a local park, a gym at your hotel, or join an exercise class for the day. Keep things interesting by varying your activities and trying something new.  Involve the whole family in staying active. Rent bikes for an afternoon, plan a walk after dinner, or try something different like surfing! You can also let your kids choose an activity. You might be surprised to hear what ideas they have!    Mind Your Mental Health  Vacations are meant to be relaxing and a chance to recharge. Make sure you have a vacation that helps you rest and rejuvenate! Sometimes it’s easy to plan too many activities and take on too many commitments. Take a moment to find balance between fun and fitness.    Pack items you enjoy. Bring your favorite books, puzzles, music, or whatever you love to ensure you have time to do just that. Sneaking away for a few minutes can give you time to spend on your favorite things.  Remember to rest. Vacations can be full of exciting things, but it’s also important to take it easy. Take an afternoon nap or find some quiet time alone to recharge.    Include activities that you enjoy. Vacations offer a lot of things to do, so make sure you have activities that you personally enjoy along with the rest of the family.    How to: Make it Through a Long Day of Travel  Whether you’re taking a road trip for a few hours or a plane across the [place] travel days require some planning. Here are some tips to make your trip easier.    Pack a snack bag for long road trips or airport days. When in the car, consider bringing a cooler with water to avoid sugary snacks at the gas stations. Pack fresh fruits, vegetables, water, cheese and meats. Include snacks like popcorn, pretzels or whole-grain crackers. Airline travel  can be more challenging, but you can pack snacks such as protein bars, trail mix or popcorn in your carry-on. Bring a refillable water bottle to stay hydrated throughout the day!    Find ways to walk, even on your busiest travel day. If you’re driving, take quick 10-15-minute walks every couple of hours. It will not only give you some cardio exercise but also reduce stress and improve your mood during long car rides. If you’re stuck in an airport, walk up and down the terminal while you wait.  Bring items like books, adult coloring books or knitting supplies to keep busy and reduce screen time. This can be a great opportunity to explore a new hobby and let your creative juices flow.  How to: Make Healthy Food Choices When Eating Out  Making healthy food choices while eating out on vacation can be challenging.    Instead of donuts and pastries for breakfast, go for high-protein items like eggs, turkey sausage, turkey aguayo, oats and whole-grain toast. Ask for fresh fruit or low-fat yogurt on the side to make breakfast even more nutritious.  Restaurant portions can be large. If someone in your group is willing to split a meal, that can be an easy solution. Since you might not have enough space to take leftovers with you, you may have to leave some behind.  When looking at the lunch or dinner menu, choose grilled, baked or boiled options. This can save you a lot of calories and fat. Be mindful of side dishes, as they can add up quickly. Consider swapping a high-calorie side item for a side salad, fruit or vegetables.    Desserts can be tempting, but plan ahead if you want to order one. Choose a lower-calorie meal to balance it out. You can also share the dessert with others at your table to enjoy a smaller portion.  How to: Deal With an unexpected change of plans  We’ve all been there. Your perfectly planned vacation goes sideways. How do you make the most of these days? It can be easy to throw in the towel, but always  be ready with a backup plan.    The weather can change unexpectedly. A rainy day might ruin your beach or mountain hike plans. Take on the challenge and find new ways to have fun. Look for an indoor pool or an activity center to keep working towards your goals!    Your carefully chosen restaurant may not have healthy options, or your grocery store delivery might be late. Don’t give up; just adapt and make a new decision. Make the best choice you can and move on to the next meal. It’s okay if it’s not perfect!    Sometimes your perfectly planned day doesn’t go as expected. Kids may start fighting during a trip to the park, or someone might get sick on vacation. Focus on the positive moments and enjoy the time you have.  Wherever you go on your summer vacation, make sure to enjoy the break, relaxation and adventure. Taking time away from your usual routine can be amazing, and keeping up with your health and fitness goals can make your vacation even better.    A Sample Walking Program  Experts recommend walking briskly on most days. Aim for a target of 30 minutes on most days, or 150 or more minutes a week. Walking programs can help you reach this goal by slowly increasing the frequency and the amount of  time you walk. Try this walking program:  First week  Walk 3 times a week.  Walk for 5 minutes each time.  Second week  Walk 3 times a week.  Walk for 10 minutes each time.  Third week  Walk 3 times a week.  Walk for 13 minutes each time.  Fourth week  Walk 3 times a week.  Walk for 15 minutes each time.  Fifth week  Walk 4 times a week.  Walk for 15 minutes each time.  Sixth week and beyond  Gradually increase your minutes of walking each time, and your number of times each week, until you reach 30 minutes, 5-7 days of the week.  Tips for getting the most from your walking program  Walk briskly. If you can sing, speed up. If you can’t talk easily, slow down.  Choose good walking shoes with padded soles and good arch  support.  Don’t use hand or ankle weights. They can cause injuries.  Walk indoors if the weather is bad. Use a treadmill or walk inside a shopping mall  Before you start walking, check with your healthcare provider if you are new to exercise, over 40, overweight, or a smoker. Also check with your provider  if you have heart disease, high blood pressure, diabetes, arthritis, asthma, or any other health problem that concerns you. Your provider can help you get started and help you stay safe.   Date Last Reviewed: 8/13/2015 © 2000-2016 [employer]. [address] [place] [address]. All rights reserved. This information is not intended as a substitute for professional medical care. Always follow your healthcare professional's instructions.     Medication use and SEs reviewed with patient.    Return in about 4 months (around 10/19/2024) for weight management via clinic or VV.    Patient verbalizes understanding.    DOCUMENTATION OF TIME SPENT: Code selection for this visit was based on time spent : 20 minutes on date of service in preparing to see the patient, obtaining and/or reviewing separately obtained history, performing a medically appropriate examination, counseling and educating the patient/family/caregiver, ordering medications or testing, referring and communicating with other healthcare providers, documenting clinical information in the electronic medical record, independently interpreting results and communicating results to the patient/family/caregiver and care coordination with the patient's other providers.

## 2024-06-24 DIAGNOSIS — G43.709 CHRONIC MIGRAINE WITHOUT AURA WITHOUT STATUS MIGRAINOSUS, NOT INTRACTABLE: ICD-10-CM

## 2024-06-24 RX ORDER — PROPRANOLOL HYDROCHLORIDE 80 MG/1
CAPSULE, EXTENDED RELEASE ORAL
Qty: 90 CAPSULE | Refills: 3 | Status: SHIPPED | OUTPATIENT
Start: 2024-06-24

## 2024-06-24 NOTE — TELEPHONE ENCOUNTER
.A refill request was received for:  Requested Prescriptions     Pending Prescriptions Disp Refills    PROPRANOLOL HCL ER 80 MG Oral Capsule SR 24 Hr [Pharmacy Med Name: PROPRANOLOL ER 80MG CAPSULES] 90 capsule 3     Sig: TAKE 1 CAPSULE(80 MG) BY MOUTH DAILY       Last refill date:   2/20/2023    Last office visit: 3/1/2024    Follow up due:  Future Appointments   Date Time Provider Department Center   10/23/2024  3:20 PM Giana Peña APRN [facility]

## 2024-10-17 ENCOUNTER — APPOINTMENT (OUTPATIENT)
Dept: MRI IMAGING | Facility: HOSPITAL | Age: 44
End: 2024-10-17
Attending: EMERGENCY MEDICINE
Payer: COMMERCIAL

## 2024-10-17 ENCOUNTER — HOSPITAL ENCOUNTER (EMERGENCY)
Facility: HOSPITAL | Age: 44
Discharge: HOME | End: 2024-10-17
Attending: EMERGENCY MEDICINE
Payer: COMMERCIAL

## 2024-10-17 VITALS
SYSTOLIC BLOOD PRESSURE: 147 MMHG | HEIGHT: 74 IN | DIASTOLIC BLOOD PRESSURE: 96 MMHG | RESPIRATION RATE: 22 BRPM | BODY MASS INDEX: 35 KG/M2 | HEART RATE: 75 BPM | OXYGEN SATURATION: 96 % | WEIGHT: 272.69 LBS

## 2024-10-17 DIAGNOSIS — R42 VERTIGO: Primary | ICD-10-CM

## 2024-10-17 LAB
ALBUMIN SERPL-MCNC: 4.6 G/DL (ref 3.2–4.8)
ALBUMIN/GLOB SERPL: 1.9 {RATIO} (ref 1–2)
ALP LIVER SERPL-CCNC: 93 U/L
ALT SERPL-CCNC: 22 U/L
ANION GAP SERPL CALC-SCNC: 1 MMOL/L (ref 0–18)
AST SERPL-CCNC: 16 U/L (ref ?–34)
ATRIAL RATE: 77 BPM
BASOPHILS # BLD AUTO: 0.03 X10(3) UL (ref 0–0.2)
BASOPHILS NFR BLD AUTO: 0.6 %
BILIRUB SERPL-MCNC: 0.4 MG/DL (ref 0.3–1.2)
BUN BLD-MCNC: 11 MG/DL (ref 9–23)
CALCIUM BLD-MCNC: 9.5 MG/DL (ref 8.7–10.4)
CHLORIDE SERPL-SCNC: 110 MMOL/L (ref 98–112)
CO2 SERPL-SCNC: 28 MMOL/L (ref 21–32)
CREAT BLD-MCNC: 0.98 MG/DL
EGFRCR SERPLBLD CKD-EPI 2021: 98 ML/MIN/1.73M2 (ref 60–?)
EOSINOPHIL # BLD AUTO: 0.12 X10(3) UL (ref 0–0.7)
EOSINOPHIL NFR BLD AUTO: 2.6 %
ERYTHROCYTE [DISTWIDTH] IN BLOOD BY AUTOMATED COUNT: 13.3 %
GLOBULIN PLAS-MCNC: 2.4 G/DL (ref 2–3.5)
GLUCOSE BLD-MCNC: 78 MG/DL (ref 70–99)
GLUCOSE BLD-MCNC: 95 MG/DL (ref 70–99)
HCT VFR BLD AUTO: 41.8 %
HGB BLD-MCNC: 14.5 G/DL
IMM GRANULOCYTES # BLD AUTO: 0.01 X10(3) UL (ref 0–1)
IMM GRANULOCYTES NFR BLD: 0.2 %
LYMPHOCYTES # BLD AUTO: 1.94 X10(3) UL (ref 1–4)
LYMPHOCYTES NFR BLD AUTO: 41.4 %
MCH RBC QN AUTO: 29.1 PG (ref 26–34)
MCHC RBC AUTO-ENTMCNC: 34.7 G/DL (ref 31–37)
MCV RBC AUTO: 83.9 FL
MONOCYTES # BLD AUTO: 0.48 X10(3) UL (ref 0.1–1)
MONOCYTES NFR BLD AUTO: 10.2 %
NEUTROPHILS # BLD AUTO: 2.11 X10 (3) UL (ref 1.5–7.7)
NEUTROPHILS # BLD AUTO: 2.11 X10(3) UL (ref 1.5–7.7)
NEUTROPHILS NFR BLD AUTO: 45 %
OSMOLALITY SERPL CALC.SUM OF ELEC: 287 MOSM/KG (ref 275–295)
P AXIS: 23 DEGREES
P-R INTERVAL: 172 MS
PLATELET # BLD AUTO: 206 10(3)UL (ref 150–450)
POTASSIUM SERPL-SCNC: 3.9 MMOL/L (ref 3.5–5.1)
PROT SERPL-MCNC: 7 G/DL (ref 5.7–8.2)
Q-T INTERVAL: 380 MS
QRS DURATION: 100 MS
QTC CALCULATION (BEZET): 430 MS
R AXIS: -3 DEGREES
RBC # BLD AUTO: 4.98 X10(6)UL
SODIUM SERPL-SCNC: 139 MMOL/L (ref 136–145)
T AXIS: 34 DEGREES
VENTRICULAR RATE: 77 BPM
WBC # BLD AUTO: 4.7 X10(3) UL (ref 4–11)

## 2024-10-17 PROCEDURE — 99285 EMERGENCY DEPT VISIT HI MDM: CPT

## 2024-10-17 PROCEDURE — 93010 ELECTROCARDIOGRAM REPORT: CPT

## 2024-10-17 PROCEDURE — 82962 GLUCOSE BLOOD TEST: CPT

## 2024-10-17 PROCEDURE — 70551 MRI BRAIN STEM W/O DYE: CPT | Performed by: EMERGENCY MEDICINE

## 2024-10-17 PROCEDURE — 96374 THER/PROPH/DIAG INJ IV PUSH: CPT

## 2024-10-17 PROCEDURE — 93005 ELECTROCARDIOGRAM TRACING: CPT

## 2024-10-17 PROCEDURE — 80053 COMPREHEN METABOLIC PANEL: CPT | Performed by: EMERGENCY MEDICINE

## 2024-10-17 PROCEDURE — 85025 COMPLETE CBC W/AUTO DIFF WBC: CPT | Performed by: EMERGENCY MEDICINE

## 2024-10-17 RX ORDER — MECLIZINE HYDROCHLORIDE 25 MG/1
50 TABLET ORAL 3 TIMES DAILY PRN
Qty: 15 TABLET | Refills: 0 | Status: SHIPPED | OUTPATIENT
Start: 2024-10-17

## 2024-10-17 RX ORDER — DIAZEPAM 10 MG/2ML
5 INJECTION, SOLUTION INTRAMUSCULAR; INTRAVENOUS ONCE
Status: COMPLETED | OUTPATIENT
Start: 2024-10-17 | End: 2024-10-17

## 2024-10-17 NOTE — ED INITIAL ASSESSMENT (HPI)
Patient arrives from via ambulance with complaint of vertigo and dizziness. Pt was seen at  yesterday and given meclozine. Pt states his symptoms got worse this morning. Pt also states he hit his head when getting on ambulance stretcher.

## 2024-10-17 NOTE — ED PROVIDER NOTES
MRI BRAIN (CPT=70551)    Result Date: 10/17/2024  PROCEDURE:  MRI BRAIN (CPT=70551)  COMPARISON:  [facility] [address] [facility], MR, MRI BRAIN (CPT=70551), 6/11/2021, 7:14 AM.  INDICATIONS:  vertigo and dizzy  TECHNIQUE:  MRI of the brain was performed with multi-planar T1, T2-weighted images with FLAIR sequences and diffusion weighted images without infusion.  PATIENT STATED HISTORY: (As transcribed by Technologist)  Patient is being evaluated for vertigo and dizzy    FINDINGS:  The ventricles and sulci are within normal limits.  There is no midline shift or mass effect.  The basal cisterns are patent.   There is no acute intracranial hemorrhage or extra-axial fluid collection identified.   There are trace punctate foci of T2 hyperintense signal within the periventricular and subcortical white matter.   There is no restricted diffusion to suggest acute ischemia/infarction.  Mild mucosal thickening within the ethmoid sinus.  The expected major intracranial flow voids are present.            CONCLUSION:  1. No acute infarct, acute intracranial hemorrhage, or hydrocephalus. 2. There are trace punctate foci of T2 hyperintense signal within the periventricular and subcortical white matter.  This finding is nonspecific.  This could represent the sequelae of migraine headaches, trace chronic small vessel ischemic disease, or a demyelinating process among many other etiologies.   LOCATION:  [facility]   Dictated by (CST): Stromberg, LeRoy, MD on 10/17/2024 at 4:18 PM     Finalized by (CST): Stromberg, LeRoy, MD on 10/17/2024 at 4:27 PM           Reviewed MRI as above.  Patient can be discharged home.  Per Dr. Grijalva's instructions

## 2024-10-17 NOTE — DISCHARGE INSTRUCTIONS
Google \"half somersault maneuver for vertigo\" and follow the instructions in the video this may help relieve your symptoms    Return for new or worsening symptoms such as headache, double vision, weakness

## 2024-10-17 NOTE — ED QUICK NOTES
Rounded on Pt. Pt resting and stated he is doing well. Bed is locked and in lowest position. Call light within reach.

## 2024-10-17 NOTE — ED QUICK NOTES
Per Natalie from MRI, pt's test will be done in approx 3-4 hours. Pt and wife made aware. Verbalizing understanding

## 2024-10-17 NOTE — ED PROVIDER NOTES
Patient Seen in: [facility] Emergency Department      History     Chief Complaint   Patient presents with    Dizziness     Stated Complaint: vertigo and dizzy    Subjective:   HPI      44-year-old with a history of anxiety, depression, migraines presents with his spouse for evaluation of dizziness.  Symptoms started last night.  [facility] became acutely dizzy like the room was spinning.  Was may be having some difficulty with speech as well.  Was taken to a hospital in [place], had a CT scan which he was told was normal and by the time he was discharged she was feeling better was able to walk.  He does not recall getting any contrast for the scan.  He states that he woke up feeling okay this morning however around 1000 his symptoms returned.  He is now quite dizzy again has a sense of movement.  No headache.  No double vision or blurry vision.  No nausea or vomiting.  No weakness or numbness of his extremities.  No ear pain hearing changes or ringing in his ears.  He has never had this before.  Objective:     Past Medical History:    Anxiety state, unspecified    Back pain    COVID-19    Depression    Displacement of lumbar intervertebral disc without myelopathy    Log Date: 08/06/2012 Fusion of L2-3     Internal hemorrhoids with complication    Migraine    Migraines              No pertinent past surgical history.              No pertinent social history.                Physical Exam     ED Triage Vitals   BP 10/17/24 1153 (!) 177/106   Pulse 10/17/24 1153 76   Resp 10/17/24 1153 15   Temp --    Temp src --    SpO2 10/17/24 1230 97 %   O2 Device 10/17/24 1153 None (Room air)       Current Vitals:   Vital Signs  BP: (!) 150/94  Pulse: 70  Resp: 21  MAP (mmHg): (!) 110    Oxygen Therapy  SpO2: 93 %  O2 Device: None (Room air)        Physical Exam  General: Patient is awake, alert in no acute distress.   HEENT:  Pupils are PERRL.  Horizontal fatigable nystagmus noted to the left.   Extraocular muscles are intact.  Sclera are not icteric.  Conjunctivae within normal limits.  Mucous members are moist.   Cardiovascular: Regular rate and rhythm, normal S1-S2.  Respiratory: Lungs are clear to auscultation bilaterally.   Abdomen: Soft, nontender, nondistended.  Neuro: Cranial nerves II through XII are intact bilaterally.  Normal strength and sensation bilateral UE and LE.  Normal finger-nose-finger, normal heel shin.  Patient is alert and answers questions appropriately    ED Course     Labs Reviewed   COMP METABOLIC PANEL (14) - Normal   POCT GLUCOSE - Normal   CBC WITH DIFFERENTIAL WITH PLATELET   RAINBOW DRAW LAVENDER   RAINBOW DRAW LIGHT GREEN   RAINBOW DRAW BLUE   RAINBOW DRAW GOLD     EKG    Rate, intervals and axes as noted on EKG Report.  Rate: 77  Rhythm: Sinus Rhythm  Reading: No ST elevation or depression.  No dysrhythmia.  Normal intervals including QTc 430.         MRI brain: pending    Valium ordered       MDM      History is obtained from: EMS, spouse    Previous records reviewed as noted in HPI    Differential includes, but is not limited to, stroke, vestibular neuronitis, BPPV, cerebellar tumor    Review of any laboratory testing: CBC, CMP unremarkable     Review of any radiographic studies: MRI brain pending    Shared decision making with the patient.  MRI to evaluate for stroke or other central processes pending.  If unremarkable patient can be discharged home to follow-up as an outpatient.  Care the patient endorsed the oncoming physician who will follow-up on MRI and disposition as appropriate    The administration of these medications were addressed : Meclizine if patient is able to be discharged home                             Medical Decision Making      Disposition and Plan     Clinical Impression:  1. Vertigo         Disposition:  Pending    Follow-up:  Katharine Salvador MD  [address] [place] [address] [id number] [phone]    Schedule an appointment as soon as  possible for a visit in 1 week(s)  or your usual PCP          Medications Prescribed:  Current Discharge Medication List        START taking these medications    Details   meclizine 25 MG Oral Tab Take 2 tablets (50 mg total) by mouth 3 (three) times daily as needed.  Qty: 15 tablet, Refills: 0                 Supplementary Documentation:

## 2024-10-22 ENCOUNTER — TELEPHONE (OUTPATIENT)
Dept: NEUROLOGY | Facility: CLINIC | Age: 44
End: 2024-10-22

## 2024-10-22 ENCOUNTER — OFFICE VISIT (OUTPATIENT)
Dept: NEUROLOGY | Facility: CLINIC | Age: 44
End: 2024-10-22
Payer: COMMERCIAL

## 2024-10-22 VITALS
OXYGEN SATURATION: 98 % | RESPIRATION RATE: 16 BRPM | HEART RATE: 77 BPM | BODY MASS INDEX: 35 KG/M2 | WEIGHT: 276 LBS | DIASTOLIC BLOOD PRESSURE: 100 MMHG | SYSTOLIC BLOOD PRESSURE: 140 MMHG

## 2024-10-22 DIAGNOSIS — G43.109 MIGRAINE WITH AURA AND WITHOUT STATUS MIGRAINOSUS, NOT INTRACTABLE: ICD-10-CM

## 2024-10-22 DIAGNOSIS — R47.89 EPISODE OF CHANGE IN SPEECH: ICD-10-CM

## 2024-10-22 DIAGNOSIS — G43.709 CHRONIC MIGRAINE WITHOUT AURA WITHOUT STATUS MIGRAINOSUS, NOT INTRACTABLE: ICD-10-CM

## 2024-10-22 DIAGNOSIS — G43.109 COMPLICATED MIGRAINE: Primary | ICD-10-CM

## 2024-10-22 PROCEDURE — 3080F DIAST BP >= 90 MM HG: CPT | Performed by: OTHER

## 2024-10-22 PROCEDURE — 99204 OFFICE O/P NEW MOD 45 MIN: CPT | Performed by: OTHER

## 2024-10-22 PROCEDURE — 3077F SYST BP >= 140 MM HG: CPT | Performed by: OTHER

## 2024-10-22 RX ORDER — PROPRANOLOL HYDROCHLORIDE 80 MG/1
1 CAPSULE, EXTENDED RELEASE ORAL DAILY
Qty: 90 CAPSULE | Refills: 1 | Status: SHIPPED | OUTPATIENT
Start: 2024-10-22

## 2024-10-22 NOTE — PROGRESS NOTES
Patient is here today vertigo   Patient stated two weeks ago he had confusing   Patient stated he lost his thoughts his words  Slight dizziness  Patient stated what worried him the most was lost of memory  Patient stated he didn't know what the day was or the month  Patient stated after all of this it happened again the next day it became worse.  Patient stated he fainted and his words was slurred  Patient stated he did have a MRI done.

## 2024-10-22 NOTE — PROGRESS NOTES
[facility] - [facility]   Neurology    Johan Solomon Patient Status:  No patient class for patient encounter    1980 MRN [id number]   Location [facility], [facility] PCP None Pcp              HPI:   Johan Solomon is a(n) 44 year old male with history of anxiety, depression, migraines, JUDY, covid 19, who presents at the request of Dr. Grijalva for evaluation of vertigo. About a week ago he was at a [facility] facility around 5:30pm and became acutely dizzy, seemed lightheaded, and he was also sweating. Vision was crossing. Had last eaten lunch. Had not eaten much that day, last two days prior to that had watery diarrhea, vomiting. Felt slow, didn't know the rules of the games. Speech seemed slurred. BP was systolic 150's. Felt better later that evening, after IV fluids in an outside ED, next am felt ok, but symptoms returned later that morning. Had breakfast, then at work started having difficulty talking. Didn't remember names, sentences weren't complete, lasted a few minutes, then the room started spinning, horizontally, and then vertically. He fell because of it. Lasted 30 minutes. He has ran out of propranolol 3 weeks ago. Then developed a severe migraine. Vision was blurry. Throbbing, photophobia, phonophobia. This was the second ED visit. Gets migraines 2 times a year if taking propranolol. Restarted propranolol 80mg a few days ago. Since the episodes he has had tremulousness, and feels hollow.     Pertinent imaging and laboratory work-up:   Component      Latest Ref Rng 2021   Cholesterol, Total      <200 mg/dL 189    HDL Cholesterol      40 - 59 mg/dL 33 (L)    Triglycerides      30 - 149 mg/dL 163 (H)    LDL Cholesterol Calc      <100 mg/dL 123 (H)    VLDL      0 - 30 mg/dL 33 (H)    NON-HDL CHOLESTEROL      <130 mg/dL 156 (H)    Patient Fasting? Yes    HEMOGLOBIN A1c      <5.7 % 5.4    ESTIMATED AVERAGE GLUCOSE      68 - 126 mg/dL 108        Legend:  (L) Low  (H) High  MRI brain 10/17/24  CONCLUSION:    1. No acute infarct, acute intracranial hemorrhage, or hydrocephalus.   2. There are trace punctate foci of T2 hyperintense signal within the periventricular and subcortical white matter.  This finding is nonspecific.  This could represent the sequelae of migraine headaches, trace chronic small vessel ischemic disease, or a   demyelinating process among many other etiologies.     Past Medical History:  Past Medical History:    Anxiety state, unspecified    Back pain    COVID-19    Depression    Displacement of lumbar intervertebral disc without myelopathy    Log Date: 08/06/2012 Fusion of L2-3     Internal hemorrhoids with complication    Migraine    Migraines        Past Surgical History:  Past Surgical History:   Procedure Laterality Date    Ankle fracture surgery      Excis lumbar disk,one level      fusion L2-3    Revise ulnar nerve at elbow  2009    ulnar release to both arms.     Spinal fusion  2014       Family History:  family history includes ADHD in his son; Diabetes in his paternal grandfather; No Known Problems in his father and mother.      Social History:   reports that he has never smoked. He has never used smokeless tobacco. He reports current alcohol use. He reports that he does not currently use drugs after having used the following drugs: Cannabis.    Allergies:  Allergies[1]    MEDICATIONS:    Current Outpatient Medications:     Propranolol HCl ER Beads 80 MG Oral Capsule SR 24 Hr, Take 1 capsule (80 mg total) by mouth nightly., Disp: 90 capsule, Rfl: 1    meclizine 25 MG Oral Tab, Take 2 tablets (50 mg total) by mouth 3 (three) times daily as needed., Disp: 15 tablet, Rfl: 0    PROPRANOLOL HCL ER 80 MG Oral Capsule SR 24 Hr, TAKE 1 CAPSULE(80 MG) BY MOUTH DAILY, Disp: 90 capsule, Rfl: 3    Tirzepatide-Weight Management (ZEPBOUND) 7.5 MG/0.5ML Subcutaneous Solution Auto-injector, Inject 7.5 mg into the skin once a week., Disp: 2 mL,  Rfl: 3    clonazePAM 1 MG Oral Tab, , Disp: , Rfl:     lamoTRIgine  MG Oral Tablet 24 Hr, Take by mouth daily. Takes with 100mg for total dose 400mg, Disp: , Rfl:     lamoTRIgine  MG Oral Tablet 24 Hr, Take 1 tablet (100 mg total) by mouth daily., Disp: , Rfl:     Amphetamine-Dextroamphet ER 20 MG Oral Capsule SR 24 Hr, Take 1 capsule (20 mg total) by mouth every morning., Disp: , Rfl:       Review of Systems:   A comprehensive 10 point review of systems was completed.     Pertinent positives and negatives noted in the HPI.         PHYSICAL EXAM:   Neurological Exam  Vitals  Vitals:    10/22/24 0827   BP: (!) 140/100   Pulse: 77   Resp: 16     General Appearance: Patient is a 44 year old male in no acute distress  Cardiac: Normal rate & regular rhythm  Skin: There are no rashes or other skin lesions.  Musculoskeletal: There is no scoliosis, or joint deformities  Neurologic examination:  Mental status: Patient is alert, attentive, and oriented x 3. Language is coherent and fluent without aphasia. Memory, comprehension and ability to follow commands were intact.   Cranial nerves II-XII: Optic discs were sharp. Pupils were round and reacted to light. Extraocular movements were full. There was no face, jaw, palate or tongue weakness or atrophy. Facial sensation was normal. Hearing was grossly intact. Shoulder shrug was normal.   Motor exam revealed normal muscle bulk and tone. No atrophy or fasciculations. Manual muscle testing revealed MRC grade 5/5 strength throughout including proximal and distal muscles of the arms and legs.  Deep tendon reflexes were 2 at the biceps, brachioradialis, triceps, knee jerk, and ankle jerk. Plantar responses were flexor bilaterally.   Sensory exam revealed normal light touch perception. Vibratory perception and proprioception were intact at the toes. Pinprick and temperature were normal. Romberg sign was absent.  Complex motor skills revealed normal coordination.  Finger-nose-finger intact.   Gait was narrow and stable, was able to walk on heels, toes and tandem without any difficulty.     ASSESSMENT/ACTIVE PROBLEM LIST:     Encounter Diagnoses   Name Primary?    Episode of change in speech     Complicated migraine Yes    Migraine with aura and without status migrainosus, not intractable        Discussion/Plan:  Episode of lightheadedness with slowed thinking and slowed speech, and second episode of nonsensical speech and vertigo and diplopia, with migraine headache  Having residual symptoms of phonophobia, and feeling off. Also having tremors. No family history of tremors or essential tremor.  In the setting of recent dehydration/severe diarrhea  MRI brain negative  Main consideration is complex migraine, very unlikely TIA.  Obtain CTA H and N. Check lipid panel and A1c.  Restart propranolol 80mg ER daily    Requested Prescriptions     Signed Prescriptions Disp Refills    Propranolol HCl ER Beads 80 MG Oral Capsule SR 24 Hr 90 capsule 1     Sig: Take 1 capsule (80 mg total) by mouth nightly.          We discussed in depth regarding the diagnosis, prognosis, treatment. The patient was given ample opportunity to ask questions. All questions and concerns were addressed.     Alanis Montero,   Neuromuscular and General Neurology  [facility]             [1] No Known Allergies

## 2024-10-22 NOTE — TELEPHONE ENCOUNTER
Fax received from [facility] requesting prior authorization for Innopran 80MG ER capsules- Dr Montero ordered Propranolol ER Beads 80MG.  Spoke with Dr Montero who states Propranolol 80mg capsules (not beads) to be prescribed.  Rx propranolol 80mg ER capsules #90 with 1 refill e scribed to [facility] per verbal order.

## 2024-10-22 NOTE — PATIENT INSTRUCTIONS
Refill policies:    Allow 2-3 business days for refills; controlled substances may take longer.  Contact your pharmacy at least 5 days prior to running out of medication and have them send an electronic request or submit request through the “request refill” option in your [employer] account.  Refills are not addressed on weekends; covering physicians do not authorize routine medications on weekends.  No narcotics or controlled substances are refilled after noon on Fridays or by on call physicians.  By law, narcotics must be electronically prescribed.  A 30 day supply with no refills is the maximum allowed.  If your prescription is due for a refill, you may be due for a follow up appointment.  To best provide you care, patients receiving routine medications need to be seen at least once a year.  Patients receiving narcotic/controlled substance medications need to be seen at least once every 3 months.  In the event that your preferred pharmacy does not have the requested medication in stock (e.g. Backordered), it is your responsibility to find another pharmacy that has the requested medication available.  We will gladly send a new prescription to that pharmacy at your request.    Scheduling Tests:    If your physician has ordered radiology tests such as MRI or CT scans, please contact Central Scheduling at [phone] right away to schedule the test.  Once scheduled, the [facility] Centralized Referral Team will work with your insurance carrier to obtain pre-certification or prior authorization.  Depending on your insurance carrier, approval may take 3-10 days.  It is highly recommended patients assure they have received an authorization before having a test performed.  If test is done without insurance authorization, patient may be responsible for the entire amount billed.      Precertification and Prior Authorizations:  If your physician has recommended that you have a procedure or additional testing performed the [facility]  Centralized Referral Team will contact your insurance carrier to obtain pre-certification or prior authorization.    You are strongly encouraged to contact your insurance carrier to verify that your procedure/test has been approved and is a COVERED benefit.  Although the [facility] Centralized Referral Team does its due diligence, the insurance carrier gives the disclaimer that \"Although the procedure is authorized, this does not guarantee payment.\"    Ultimately the patient is responsible for payment.   Thank you for your understanding in this matter.  Paperwork Completion:  If you require FMLA or disability paperwork for your recovery, please make sure to either drop it off or have it faxed to our office at [phone]. Be sure the form has your name and date of birth on it.  The form will be faxed to our Forms Department and they will complete it for you.  There is a 25$ fee for all forms that need to be filled out.  Please be aware there is a 10-14 day turnaround time.  You will need to sign a release of information (SUNNY) form if your paperwork does not come with one.  You may call the Forms Department with any questions at [phone].  Their fax number is [phone].

## 2024-10-23 ENCOUNTER — OFFICE VISIT (OUTPATIENT)
Dept: INTERNAL MEDICINE CLINIC | Facility: CLINIC | Age: 44
End: 2024-10-23
Payer: COMMERCIAL

## 2024-10-23 VITALS
HEIGHT: 75 IN | SYSTOLIC BLOOD PRESSURE: 122 MMHG | DIASTOLIC BLOOD PRESSURE: 80 MMHG | RESPIRATION RATE: 16 BRPM | HEART RATE: 74 BPM | BODY MASS INDEX: 34.32 KG/M2 | WEIGHT: 276 LBS

## 2024-10-23 DIAGNOSIS — Z86.39 HISTORY OF MORBID OBESITY: ICD-10-CM

## 2024-10-23 DIAGNOSIS — E66.811 CLASS 1 OBESITY WITH SERIOUS COMORBIDITY AND BODY MASS INDEX (BMI) OF 34.0 TO 34.9 IN ADULT, UNSPECIFIED OBESITY TYPE: ICD-10-CM

## 2024-10-23 DIAGNOSIS — Z51.81 ENCOUNTER FOR THERAPEUTIC DRUG MONITORING: Primary | ICD-10-CM

## 2024-10-23 DIAGNOSIS — E88.819 INSULIN RESISTANCE: ICD-10-CM

## 2024-10-23 DIAGNOSIS — E78.2 MIXED HYPERLIPIDEMIA: ICD-10-CM

## 2024-10-23 PROCEDURE — 3008F BODY MASS INDEX DOCD: CPT | Performed by: NURSE PRACTITIONER

## 2024-10-23 PROCEDURE — 3074F SYST BP LT 130 MM HG: CPT | Performed by: NURSE PRACTITIONER

## 2024-10-23 PROCEDURE — 3079F DIAST BP 80-89 MM HG: CPT | Performed by: NURSE PRACTITIONER

## 2024-10-23 PROCEDURE — 99213 OFFICE O/P EST LOW 20 MIN: CPT | Performed by: NURSE PRACTITIONER

## 2024-10-23 RX ORDER — TIRZEPATIDE 7.5 MG/.5ML
7.5 INJECTION, SOLUTION SUBCUTANEOUS WEEKLY
Qty: 2 ML | Refills: 3 | Status: SHIPPED | OUTPATIENT
Start: 2024-10-23

## 2024-10-23 NOTE — PATIENT INSTRUCTIONS
Continue making lifestyle changes that focus on good nutrition, regular exercise and stress management.    Medication Plan: Continue Zepbound at current dose.    Next steps to work on before next office visit include: Great work with weight loss success! Continue follow up with neurology. Recommend CT heart scan for screening heart disease and reduction in visceral fat with recommendations listed below.    Baseline body composition (BC) completed today with findings of total body fat 30.9% (goal < 25%), Visceral Fat 19 (goal <10), BMI 34 and muscle mass 18.6% (goal >21%).    Build Muscle & Lose Fat  The great fat vs muscle diagram below paints a clear picture of why it’s so important for you to build muscle in order to lose fat.  Maybe you’ve wondered about muscle vs fat and why you need to build muscle to lose fat, look slim and keep the inches off.  Well, look no further! With the fat vs muscle diagram below you’ll see why healthy permanent weight loss requires you to build muscle to lose fat.  Fat vs Muscle Diagram  The facts are clear. The best way to lose fat and look slimmer is to build muscle. Since one picture’s worth a thousand words, here’s 5 lbs of muscle vs fat of the same weight. Notice 5 lbs of fat is three times bigger.    This means that if you were to build 5 lbs of muscle and lose 5 lbs of fat, you would weigh exactly the same, but look smaller and firmer.  So imagine if it were 25 lbs or 50 lbs of lost fat vs muscle gained.  This is why it’s possible for you to lose fat inches when exercising, yet show no change in scale weight. And can you see how firm the muscle looks compared to the lumpy, tapioca pudding consistency of fat?  Build Muscle to Lose Fat Benefits  Although daily physical activity, like walking, swimming and aerobics are essential to good heart health and weight management, combining muscle building weight training with cardiovascular exercise, gives you an unbeatable combination to  lose fat and keep it off permanently.  Of course it takes a few weeks before you see any measurable changes. But you’ll start to build muscle, lose fat and burn more calories from the moment you begin weight training. Building muscle helps you:  1. Burn more calories. Unlike fat, muscle beefs up your metabolism to help you burn about 70% more calories than fat can.  2. Improve appearance. When done properly, strength training can greatly improve your posture and help to prevent joint pain.  3. Build confidence. Strong muscles and joints increase your level of confidence in your abilities to perform many lifestyle activities.  4. Prevent injury. Strength training can build stronger muscles and more limber, flexible joints, which play a crucial role in preventing injury.  5. Increase bone density. Weight bearing activities improve your bone density and reduce bone loss. This helps to prevent osteoporosis.  Studies show that weight training combined with aerobics and stretching is the best way to build a strong, firm body and keep it that way.  So, if you want to look and feel better than ever, you need to build muscle to lose fat.     Taken from www.[employer] by Kai Tierney      Holiday Weight and How to Avoid It    [employer] by Karly Zhao, PhD  https://www.obesityaction.org/resources/holiday-weight-and-wmu-dz-dcitm-it/      When we think about the holidays many things come to mind - gifts, shopping, parties, family, decorating, long to-do lists --and delicious holiday treats.    Strategies for Avoiding Holiday Weight Gain  The holiday season is a busy time, and there are eating opportunities everywhere we go, such as family gatherings, office parties with trays of home-baked treats in the lunchroom, holiday and [id number] programs at our kids’ schools, treat samples being given away as we make our way through the stores to do our holiday shopping and catalogs in our mailboxes with  mouth-watering photo spreads on every page. We’re really busy, perhaps too busy to prepare the healthy meals we might otherwise prepare.    Here are a few tips that will help you negotiate this joyful time with minimum risk to your weight management goals:    Focus on maintaining your current weight. Challenging yourself to lose weight over the holidays is setting yourself up for failure.  Don’t gorge on any special holiday food because you only get to eat it once a year. With luck, you’ll still be around to enjoy it next year. On the other hand, don’t deprive yourself of anything you want to taste. Instead, take a mindful bite, savoring the sight, taste, aroma, mouth feel and sound of each special holiday treat. Eating like this leads to increased pleasure, quicker satisfaction and decreased risk for weight gain.  Avoid the trap of thinking you can eat what you want because you can just start over in the New Year. It doesn’t get any easier just because it’s January - there are always other reasons to indulge and to celebrate.  Keep up your exercise routine. This will also help reduce holiday stress.  Keep tabs on yourself. Write down what you eat, weigh yourself if you want to or try on your favorite clothes to make sure they still fit.  Create meaning beyond the food by creating new traditions that have nothing to do with food. For example, change “in our family we always have chocolate cinnamon bread with whipped cream on Kaylen morning” into “in our family we always play in the snow (or on the beach), or go for a long walk/take food and gifts to the homeless shelter on Kaylen morning.”  Sometimes, eating a particular food is our way of remembering a lost loved one. If that applies to you, find another way to remember them, like sharing memories with family members.  Remember all the reasons why reaching and maintaining a healthy weight is important to you.  Remember, unless you’re an elite athlete, you’re  unlikely to be able to “exercise off” weeks of overindulgence.  Strategies for Holiday Parties  Most of us love holiday parties and look forward to them all year. We get to dress up and go to nice places, spend time with our nearest and dearest, enjoy our favorite holiday music and engage in the traditions that are meaningful to us. Despite all of the excitement, parties can also be minefields when it comes to honoring our healthy lifestyle goals. When we love parties, we may over-indulge as a way of intensifying the positive emotions we’re already feeling, and when we dislike parties, we may over-indulge in an effort to distract ourselves from the emotional discomfort that we’re feeling.    Here are a few tips that will help you get through holiday parties without sabotaging your goals:    Avoid wearing baggy clothing that allows you to expand as you eat.  After you’ve eaten, stay away from the food tables at the party.  Keep your hands busy by finding a way to help out. It’s the best way to distract yourself from the food.  Avoid alcohol. When we drink, we’re more likely to abandon healthy eating.  Fill up with water and other low-calorie drinks.  Take a healthy dish for the pot luck - something you can eat: consider salad, fruit, raw vegetables and a healthy dip.  Focus on your relationships, not on the food - learn to focus on enjoying the people and the special holiday experiences, on building special memories for yourself and your family.  Meeting new people is another good way of distracting yourself from the food. If you’re shy, simply be a good listener.  Plan ahead. The best kind of plan, when it comes to food, is about what you are going to eat - not about what you’re not going to eat. If we focus on what we can’t eat (or what we think we shouldn’t eat), this kind of thinking can set us up for failure because it simply leaves us feeling deprived.  Don’t arrive completely famished - you’ll be more likely  to eat in a way you’ll later regret. Plan to eat on the light side both before and after the event. Think about your meal plan for the day, and leave yourself some room to eat at the party.  Coping with Holiday Stress  As you know, the holiday season can be joyful and stressful at the same time. There’s so much to do, being around family can sometimes be difficult and often, we set ourselves the goal of creating the “perfect” holiday. Being stressed puts us at risk for stress-based eating in an effort to cope.    Here are some strategies you can use to reduce your stress levels:    Focus on what you’re grateful for.  Practice deep breathing whenever you feel overwhelmed.  Keep up your exercise routine.  Remind yourself to do just one thing at a time.  Remember -- you cannot do more than your best.  Be willing to say “no” to some events, tasks or requests. Sometimes this is the best way we can take care of ourselves.  Create a holiday season schedule for yourself. Schedule and prioritize everything you need to get done.  Reduce your expectations - aim for “good enough,” not “perfect.”  If you’re alone during the holidays, pamper yourself and find a way to help others who are less fortunate. This will help reduce your loneliness.  If your relationships with family members are strained, remember that over-indulging in your favorite holiday comfort foods is not going to change how they behave towards you!  Create fun times for yourself. Having fun is a great way of reducing stress!  I hope these tips will help you not just get through the holidays, but that they’ll allow you to feel reassured that you can still have a fun and meaningful time without having to sacrifice your weight management goals. Wishing you a happy, healthy and meaningful holiday season!    Dear Doctor: I Carry All My Weight in My Abdomen. Is this Serious?    Answer provided by Yoly Collins MD    [facility] at www.obesityaction.org Winter 2017  Resource    Research pertaining to obesity has become increasingly popular since the 1980s, and has since led to the classification of obesity as a disease that is defined as “the excessive accumulation of excess body fat. More so, in the last two decades, medical researchers are starting to understand how different types of fat send signals to the rest of our body. Current findings agree that fat acts as an endocrine organ - meaning that it is not a collection of dormant cells, but secretes hormones that influence the other organs in the body.    Fat deposits occur in two types:  First, there is the fat that lies directly under the skin - known as subcutaneous fat - which is readily noticeable as we try to squeeze into last year’s jeans.  Then, there is the other type - known as visceral fat - which lies deep in our body cavities, close to our organs such as the liver and the heart. It is this deep visceral fat that can place us at a greater risk for medical problems related to obesity.  Why is Visceral Fat Dangerous?  Visceral fat lies close to our organs, and thus it can easily cause organ damage. For example, fat near the liver can accumulate in the liver cells and subsequently interfere with the liver’s normal function - to eliminate toxins in the body. Throughout decades, this can eventually lead to liver cancer. Visceral fat can also be converted into cholesterol by the liver and travel through the arteries, causing blockages that inhibit normal blood flow. This can lead to strokes and even heart attacks.    How Can I Determine if I Have Too Much Visceral Fat?  Fat deposits in various areas of the body depend on genetics, hormones and other factors. Fat deposits in the lower body, such as the hips, tend to be subcutaneous fat. Fat around the midsection, however, tends to be visceral fat. Because you mentioned that you carry more weight around your abdomen, I want to clearly define the cut-offs for what is  considered to be a waist measurement consistent with obesity - and thus a greater health risk.    Measuring Your Waist  You can perform a simple measurement to determine if you are more likely to have excess visceral fat. Take a tape measure and place the loose end at your naval. Carefully wrap the tape around you (you may want to get help from a partner to be more exact) at the level of your naval until you reach the loose end. Then, record the number of inches measured. This is your waist circumference (WC).    A waist circumference greater than 35 inches for females, and greater than 40 inches for males, meets the criteria for obesity. This number is significant because it also correlates with a higher percentage of visceral fat as compared to someone below these measures.    In a medical setting, doctors use a special x-ray technique called “Dual-energy X-ray Absorptiometry (DEXA, DXA) which can capture an image of your bones, muscle and fat. DEXA can provide a more accurate assessment of the amount of visceral fat in your body. This test is also commonly used to measure bone density.    What Can I Do to Reduce My Visceral Fat?  The good news is that visceral fat can be reversed by lifestyle changes. Although change will take some work, your heart and liver will thank you in the end!    Here are some simple lifestyle changes you can make to reduce the prevalence of visceral fat:    Reduce Your Calorie Consumption - Reducing the amount of calories you consume in general can help, but this is especially true of calories from fat sources. Specifically, your diet should contain 30 percent or fewer of its total calories from fats. Choose naturally-occurring fat sources in your diet, such as nuts and avocados, instead of processed fats like butter or hard cheese.    Read Labels Carefully - Be sure to watch the saturated fat content of the foods you eat. Ideally, the goal is to eat fewer than 7 percent of your total  calories from this type of fat because it’s more likely to clog your blood vessels. These are common in foods such as meats and whole-fat dairy.    Have Very Few Trans Fats -These are the most dangerous! Trans fats tend to be present in packaged foods, desserts, etc.    Enjoy Your Food - Don’t worry, you can still enjoy whole grains, fruits, vegetables and lean proteins in the war against visceral fat.    Walk Frequently - Studies show that visceral fat was significantly reduced when the diet changes above were combined with moderate physical activity such as brisk walking. I recommend walking until you feel you are “huffing and puffing” but can still talk. This type of exercise at least six days a week for 30 minutes a day can help you win the war against disease!    Incorporate Strength Training - Building muscle helps fight fat, so don’t neglect resistance training. You can use your own body weight, weight machines or free weights to increase your muscle mass.  Finally, I recommend that you work with your healthcare provider for additional recommendations and to monitor your health as you fight against visceral fat. Maintaining consistency with the above lifestyle changes takes dedication and time, so you may want to enlist a support system to help you maximize your outcome and optimize your health!    I recommend watching this segment on [employer]. Reverse Visceral Fat: #1 Way to Increase Your Lifespan & End Inflammation with Dr. Pedro Lopez on [employer] @ [email or web address]

## 2024-10-23 NOTE — PROGRESS NOTES
Johan Solomon is a 44 year old male presents today for f/u on medical weight loss program for the treatment of overweight, obesity, or morbid obesity with associated hyperlipidemia, insulin resistance, JUDY.    S:  Current weight   Wt Readings from Last 6 Encounters:   10/23/24 276 lb (125.2 kg)   10/22/24 276 lb (125.2 kg)   10/17/24 272 lb 11.2 oz (123.7 kg)   06/19/24 296 lb (134.3 kg)   03/01/24 (!) 311 lb (141.1 kg)   02/28/24 (!) 311 lb (141.1 kg)    AND BMI Body mass index is 34.5 kg/m²..    Patient has lost 20# since LOV 4 months ago. He has been consistent with medication and happy with current success. Since LOV was seen at ER for TIA or possible migraine variant. Following with neurology at present. Has good support.    Testing/consult completed since LOV: Dietician: [facility]    Question 10/21/2024  9:43 PM CDT - Filed by Patient   Please describe a success moment: 74 lbs lost   Please describe a challenging moment/needs for improvement: Getting gastro problems   Please complete this 24 hour food journal, listing everything you had to eat in the past day. Include the average time of day you ate these meals at    List foods, qty and prep for breakfast: 2egg 1 toast coffdd   List foods, qty and prep for lunch. Half turkey sandwich   List foods, qty and prep for dinner. [place], green beans   List foods, qty and prep for snacks. None   List the types and qty of fluids consumed Water, coffee, milk   On average, how many meals did you eat out per week? 2   Exercise    How many days per week are you active or exercise 2   On average, how many days were anaerobic (strength/resistance) exercises performed? 0   On average, how many days were aerobic (cardio) exercises performed? 1   Perceived level of exertion on a scale of 1-5, with 5 being very intense: 2   Stress    Average stress level on a scale of 1-10, with 10 being extremely stressed: 7   If greater than 5/1O how  would you grade your coping mechanisms? moderate   Sleep hours and integrity    How many hours of uninterrupted sleep do you get a night: 6   Do you feel rested in the morning: Yes   If no, what may have been disrupting your sleep? Wife alarms   Please list any goal(s) for your next visit Keep winning     Social hx and PMH reviewed. Employed from home, sedentary.  with 3 children. Good spouse support. Hx of PTSD.    REVIEW OF SYSTEMS:  GENERAL: feels well otherwise  LUNGS: denies shortness of breath with exertion  CARDIOVASCULAR: denies chest pain on exertion, denies palpitations or pedal edema  GI: denies abdominal pain.  No N/V/D/C.  MUSCULOSKELETAL: chronic knee pains reduced significantly  NEURO: denies headaches  PSYCH: denies change in behavior or mood, denies feeling sad or depressed.    EXAM:  /80   Pulse 74   Resp 16   Ht 6' 3\" (1.905 m)   Wt 276 lb (125.2 kg)   BMI 34.50 kg/m²   GENERAL: well developed, well nourished, in no apparent distress, obese  EYES: conjunctiva pink, sclera non icteric, PERRLA  LUNGS: Breathing non labored. CTA in all fields  CARDIO: RRR without murmur, normal S1 and S2 without clicks or gallops, no pedal edema.  GI: +BS  NEURO/MS: motor and sensory grossly intact  PSYCH: pleasant, cooperative, normal mood and affect    ASSESSMENT AND PLAN:  Reviewed Initial Weight Data and Goal Weight Loss:       Encounter Diagnoses   Name Primary?    Encounter for therapeutic drug monitoring Yes    Class 1 obesity with serious comorbidity and body mass index (BMI) of 34.0 to 34.9 in adult, unspecified obesity type     Mixed hyperlipidemia     Insulin resistance     History of morbid obesity              No orders of the defined types were placed in this encounter.      Meds & Refills for this Visit:  Requested Prescriptions     Signed Prescriptions Disp Refills    Tirzepatide-Weight Management (ZEPBOUND) 7.5 MG/0.5ML Subcutaneous Solution Auto-injector 2 mL 3     Sig: Inject 7.5  mg into the skin once a week.       Imaging & Consults:  CT CALCIUM SCORING      Plan:  Patient has lost 20# since LOV 4 months ago on Zepbound 7.5 mg weekly with a total weight loss of 54# since initial consult on 6/9/2021 with initial weight of 330#.  Weight loss goal: lose weight to help reduce LBP. CPM, patient is paying OOP. Hx of Ozempic with GI SEs. Hx of Metformin ER. No AOM coverage. Reviewed labs in EMR. Recommend CT heart scan for screening.  on fitness, holidays. Baseline BC completed today. See patient instructions below for additional plans and patient counseling.      Patient Instructions   Continue making lifestyle changes that focus on good nutrition, regular exercise and stress management.    Medication Plan: Continue Zepbound at current dose.    Next steps to work on before next office visit include: Great work with weight loss success! Continue follow up with neurology. Recommend CT heart scan for screening heart disease and reduction in visceral fat with recommendations listed below.    Baseline body composition (BC) completed today with findings of total body fat 30.9% (goal < 25%), Visceral Fat 19 (goal <10), BMI 34 and muscle mass 18.6% (goal >21%).    Build Muscle & Lose Fat  The great fat vs muscle diagram below paints a clear picture of why it’s so important for you to build muscle in order to lose fat.  Maybe you’ve wondered about muscle vs fat and why you need to build muscle to lose fat, look slim and keep the inches off.  Well, look no further! With the fat vs muscle diagram below you’ll see why healthy permanent weight loss requires you to build muscle to lose fat.  Fat vs Muscle Diagram  The facts are clear. The best way to lose fat and look slimmer is to build muscle. Since one picture’s worth a thousand words, here’s 5 lbs of muscle vs fat of the same weight. Notice 5 lbs of fat is three times bigger.    This means that if you were to build 5 lbs of muscle and lose 5 lbs of  fat, you would weigh exactly the same, but look smaller and firmer.  So imagine if it were 25 lbs or 50 lbs of lost fat vs muscle gained.  This is why it’s possible for you to lose fat inches when exercising, yet show no change in scale weight. And can you see how firm the muscle looks compared to the lumpy, tapioca pudding consistency of fat?  Build Muscle to Lose Fat Benefits  Although daily physical activity, like walking, swimming and aerobics are essential to good heart health and weight management, combining muscle building weight training with cardiovascular exercise, gives you an unbeatable combination to lose fat and keep it off permanently.  Of course it takes a few weeks before you see any measurable changes. But you’ll start to build muscle, lose fat and burn more calories from the moment you begin weight training. Building muscle helps you:  1. Burn more calories. Unlike fat, muscle beefs up your metabolism to help you burn about 70% more calories than fat can.  2. Improve appearance. When done properly, strength training can greatly improve your posture and help to prevent joint pain.  3. Build confidence. Strong muscles and joints increase your level of confidence in your abilities to perform many lifestyle activities.  4. Prevent injury. Strength training can build stronger muscles and more limber, flexible joints, which play a crucial role in preventing injury.  5. Increase bone density. Weight bearing activities improve your bone density and reduce bone loss. This helps to prevent osteoporosis.  Studies show that weight training combined with aerobics and stretching is the best way to build a strong, firm body and keep it that way.  So, if you want to look and feel better than ever, you need to build muscle to lose fat.     Taken from www.[employer] by Kai Barton Weight and How to Avoid It    Obesity [employer] by Karly Zhao  PhD  https://www.obesityaction.org/resources/holiday-weight-and-ptx-hs-gtblc-it/      When we think about the holidays many things come to mind - gifts, shopping, parties, family, decorating, long to-do lists --and delicious holiday treats.    Strategies for Avoiding Holiday Weight Gain  The holiday season is a busy time, and there are eating opportunities everywhere we go, such as family gatherings, office parties with trays of home-baked treats in the lunchroom, holiday and [id number] programs at our kids’ schools, treat samples being given away as we make our way through the stores to do our holiday shopping and catalogs in our mailboxes with mouth-watering photo spreads on every page. We’re really busy, perhaps too busy to prepare the healthy meals we might otherwise prepare.    Here are a few tips that will help you negotiate this joyful time with minimum risk to your weight management goals:    Focus on maintaining your current weight. Challenging yourself to lose weight over the holidays is setting yourself up for failure.  Don’t gorge on any special holiday food because you only get to eat it once a year. With luck, you’ll still be around to enjoy it next year. On the other hand, don’t deprive yourself of anything you want to taste. Instead, take a mindful bite, savoring the sight, taste, aroma, mouth feel and sound of each special holiday treat. Eating like this leads to increased pleasure, quicker satisfaction and decreased risk for weight gain.  Avoid the trap of thinking you can eat what you want because you can just start over in the New Year. It doesn’t get any easier just because it’s January - there are always other reasons to indulge and to celebrate.  Keep up your exercise routine. This will also help reduce holiday stress.  Keep tabs on yourself. Write down what you eat, weigh yourself if you want to or try on your favorite clothes to make sure they still fit.  Create meaning beyond the food  by creating new traditions that have nothing to do with food. For example, change “in our family we always have chocolate cinnamon bread with whipped cream on Christmas morning” into “in our family we always play in the snow (or on the beach), or go for a long walk/take food and gifts to the homeless shelter on Christmas morning.”  Sometimes, eating a particular food is our way of remembering a lost loved one. If that applies to you, find another way to remember them, like sharing memories with family members.  Remember all the reasons why reaching and maintaining a healthy weight is important to you.  Remember, unless you’re an elite athlete, you’re unlikely to be able to “exercise off” weeks of overindulgence.  Strategies for Holiday Parties  Most of us love holiday parties and look forward to them all year. We get to dress up and go to nice places, spend time with our nearest and dearest, enjoy our favorite holiday music and engage in the traditions that are meaningful to us. Despite all of the excitement, parties can also be minefields when it comes to honoring our healthy lifestyle goals. When we love parties, we may over-indulge as a way of intensifying the positive emotions we’re already feeling, and when we dislike parties, we may over-indulge in an effort to distract ourselves from the emotional discomfort that we’re feeling.    Here are a few tips that will help you get through holiday parties without sabotaging your goals:    Avoid wearing baggy clothing that allows you to expand as you eat.  After you’ve eaten, stay away from the food tables at the party.  Keep your hands busy by finding a way to help out. It’s the best way to distract yourself from the food.  Avoid alcohol. When we drink, we’re more likely to abandon healthy eating.  Fill up with water and other low-calorie drinks.  Take a healthy dish for the pot luck - something you can eat: consider salad, fruit, raw vegetables and a healthy  dip.  Focus on your relationships, not on the food - learn to focus on enjoying the people and the special holiday experiences, on building special memories for yourself and your family.  Meeting new people is another good way of distracting yourself from the food. If you’re shy, simply be a good listener.  Plan ahead. The best kind of plan, when it comes to food, is about what you are going to eat - not about what you’re not going to eat. If we focus on what we can’t eat (or what we think we shouldn’t eat), this kind of thinking can set us up for failure because it simply leaves us feeling deprived.  Don’t arrive completely famished - you’ll be more likely to eat in a way you’ll later regret. Plan to eat on the light side both before and after the event. Think about your meal plan for the day, and leave yourself some room to eat at the party.  Coping with Holiday Stress  As you know, the holiday season can be joyful and stressful at the same time. There’s so much to do, being around family can sometimes be difficult and often, we set ourselves the goal of creating the “perfect” holiday. Being stressed puts us at risk for stress-based eating in an effort to cope.    Here are some strategies you can use to reduce your stress levels:    Focus on what you’re grateful for.  Practice deep breathing whenever you feel overwhelmed.  Keep up your exercise routine.  Remind yourself to do just one thing at a time.  Remember -- you cannot do more than your best.  Be willing to say “no” to some events, tasks or requests. Sometimes this is the best way we can take care of ourselves.  Create a holiday season schedule for yourself. Schedule and prioritize everything you need to get done.  Reduce your expectations - aim for “good enough,” not “perfect.”  If you’re alone during the holidays, pamper yourself and find a way to help others who are less fortunate. This will help reduce your loneliness.  If your relationships with family  members are strained, remember that over-indulging in your favorite holiday comfort foods is not going to change how they behave towards you!  Create fun times for yourself. Having fun is a great way of reducing stress!  I hope these tips will help you not just get through the holidays, but that they’ll allow you to feel reassured that you can still have a fun and meaningful time without having to sacrifice your weight management goals. Wishing you a happy, healthy and meaningful holiday season!    Dear Doctor: I Carry All My Weight in My Abdomen. Is this Serious?    Answer provided by Yoly Collins MD    [facility] at www.obesityaction.org Winter 2017 Resource    Research pertaining to obesity has become increasingly popular since the 1980s, and has since led to the classification of obesity as a disease that is defined as “the excessive accumulation of excess body fat. More so, in the last two decades, medical researchers are starting to understand how different types of fat send signals to the rest of our body. Current findings agree that fat acts as an endocrine organ - meaning that it is not a collection of dormant cells, but secretes hormones that influence the other organs in the body.    Fat deposits occur in two types:  First, there is the fat that lies directly under the skin - known as subcutaneous fat - which is readily noticeable as we try to squeeze into last year’s jeans.  Then, there is the other type - known as visceral fat - which lies deep in our body cavities, close to our organs such as the liver and the heart. It is this deep visceral fat that can place us at a greater risk for medical problems related to obesity.  Why is Visceral Fat Dangerous?  Visceral fat lies close to our organs, and thus it can easily cause organ damage. For example, fat near the liver can accumulate in the liver cells and subsequently interfere with the liver’s normal function - to eliminate toxins in the body. Throughout  decades, this can eventually lead to liver cancer. Visceral fat can also be converted into cholesterol by the liver and travel through the arteries, causing blockages that inhibit normal blood flow. This can lead to strokes and even heart attacks.    How Can I Determine if I Have Too Much Visceral Fat?  Fat deposits in various areas of the body depend on genetics, hormones and other factors. Fat deposits in the lower body, such as the hips, tend to be subcutaneous fat. Fat around the midsection, however, tends to be visceral fat. Because you mentioned that you carry more weight around your abdomen, I want to clearly define the cut-offs for what is considered to be a waist measurement consistent with obesity - and thus a greater health risk.    Measuring Your Waist  You can perform a simple measurement to determine if you are more likely to have excess visceral fat. Take a tape measure and place the loose end at your naval. Carefully wrap the tape around you (you may want to get help from a partner to be more exact) at the level of your naval until you reach the loose end. Then, record the number of inches measured. This is your waist circumference (WC).    A waist circumference greater than 35 inches for females, and greater than 40 inches for males, meets the criteria for obesity. This number is significant because it also correlates with a higher percentage of visceral fat as compared to someone below these measures.    In a medical setting, doctors use a special x-ray technique called “Dual-energy X-ray Absorptiometry (DEXA, DXA) which can capture an image of your bones, muscle and fat. DEXA can provide a more accurate assessment of the amount of visceral fat in your body. This test is also commonly used to measure bone density.    What Can I Do to Reduce My Visceral Fat?  The good news is that visceral fat can be reversed by lifestyle changes. Although change will take some work, your heart and liver will thank  you in the end!    Here are some simple lifestyle changes you can make to reduce the prevalence of visceral fat:    Reduce Your Calorie Consumption - Reducing the amount of calories you consume in general can help, but this is especially true of calories from fat sources. Specifically, your diet should contain 30 percent or fewer of its total calories from fats. Choose naturally-occurring fat sources in your diet, such as nuts and avocados, instead of processed fats like butter or hard cheese.    Read Labels Carefully - Be sure to watch the saturated fat content of the foods you eat. Ideally, the goal is to eat fewer than 7 percent of your total calories from this type of fat because it’s more likely to clog your blood vessels. These are common in foods such as meats and whole-fat dairy.    Have Very Few Trans Fats -These are the most dangerous! Trans fats tend to be present in packaged foods, desserts, etc.    Enjoy Your Food - Don’t worry, you can still enjoy whole grains, fruits, vegetables and lean proteins in the war against visceral fat.    Walk Frequently - Studies show that visceral fat was significantly reduced when the diet changes above were combined with moderate physical activity such as brisk walking. I recommend walking until you feel you are “huffing and puffing” but can still talk. This type of exercise at least six days a week for 30 minutes a day can help you win the war against disease!    Incorporate Strength Training - Building muscle helps fight fat, so don’t neglect resistance training. You can use your own body weight, weight machines or free weights to increase your muscle mass.  Finally, I recommend that you work with your healthcare provider for additional recommendations and to monitor your health as you fight against visceral fat. Maintaining consistency with the above lifestyle changes takes dedication and time, so you may want to enlist a support system to help you maximize your  outcome and optimize your health!    I recommend watching this segment on [employer]. Reverse Visceral Fat: #1 Way to Increase Your Lifespan & End Inflammation with Dr. Pedro Lopez on [employer] @ [email or web address]        Medication use and SEs reviewed with patient.    Return in about 4 months (around 2/23/2025) for weight management via clinic.    Patient verbalizes understanding.    DOCUMENTATION OF TIME SPENT: Code selection for this visit was based on time spent : 20 minutes on date of service in preparing to see the patient, obtaining and/or reviewing separately obtained history, performing a medically appropriate examination, counseling and educating the patient/family/caregiver, ordering medications or testing, referring and communicating with other healthcare providers, documenting clinical information in the electronic medical record, independently interpreting results and communicating results to the patient/family/caregiver and care coordination with the patient's other providers.      Answers submitted by the patient for this visit:  Medical Weight Loss Follow Up (Submitted on 10/21/2024)  If greater than 5/1O how would you grade your coping mechanisms?: moderate

## 2024-10-28 ENCOUNTER — PATIENT MESSAGE (OUTPATIENT)
Dept: CASE MANAGEMENT | Age: 44
End: 2024-10-28

## 2024-11-01 ENCOUNTER — TELEPHONE (OUTPATIENT)
Dept: CASE MANAGEMENT | Age: 44
End: 2024-11-01

## 2024-11-01 ENCOUNTER — PATIENT MESSAGE (OUTPATIENT)
Dept: CASE MANAGEMENT | Age: 44
End: 2024-11-01

## 2024-11-01 NOTE — TELEPHONE ENCOUNTER
[facility]        Status: DENIED        Reference number [id number]     A copy of the denial letter is filed under the MEDIA tab, reference for complete details. You may reach out to Jo Ann at [phone] to discuss decision.     Please reach out to patient with plan of care.      Thank you

## 2024-11-04 NOTE — TELEPHONE ENCOUNTER
Spoke with Nila from [facility] who states Dr Montero should  to call [phone] to initiate Peer-Peer and use reference # [id number].

## 2024-11-12 ENCOUNTER — HOSPITAL ENCOUNTER (OUTPATIENT)
Dept: CT IMAGING | Facility: HOSPITAL | Age: 44
Discharge: HOME | End: 2024-11-12
Attending: Other
Payer: COMMERCIAL

## 2024-11-12 DIAGNOSIS — R47.89 EPISODE OF CHANGE IN SPEECH: ICD-10-CM

## 2024-11-12 PROCEDURE — 70496 CT ANGIOGRAPHY HEAD: CPT | Performed by: OTHER

## 2024-11-12 PROCEDURE — 70498 CT ANGIOGRAPHY NECK: CPT | Performed by: OTHER

## 2024-11-13 ENCOUNTER — PATIENT MESSAGE (OUTPATIENT)
Dept: NEUROLOGY | Facility: CLINIC | Age: 44
End: 2024-11-13

## 2024-11-21 ENCOUNTER — PATIENT MESSAGE (OUTPATIENT)
Dept: INTERNAL MEDICINE CLINIC | Facility: CLINIC | Age: 44
End: 2024-11-21

## 2024-11-21 DIAGNOSIS — E66.811 CLASS 1 OBESITY WITH SERIOUS COMORBIDITY AND BODY MASS INDEX (BMI) OF 34.0 TO 34.9 IN ADULT, UNSPECIFIED OBESITY TYPE: Primary | ICD-10-CM

## 2024-11-21 NOTE — TELEPHONE ENCOUNTER
Requesting zepbound increase  LOV: 10/23/24  RTC: 4 months  Last Relevant Labs: na  Filled: 10/23/24 #2 ml with 3 refills  7.5 mg    Future Appointments   Date Time Provider Department Center   3/24/2025  3:40 PM Giana Peña APRN [id number]   4/22/2025  9:15 AM Iveth Montero DO [facility]

## 2024-11-23 RX ORDER — TIRZEPATIDE 10 MG/.5ML
10 INJECTION, SOLUTION SUBCUTANEOUS WEEKLY
Qty: 2 ML | Refills: 2 | Status: SHIPPED | OUTPATIENT
Start: 2024-11-23

## 2025-02-07 DIAGNOSIS — E66.811 CLASS 1 OBESITY WITH SERIOUS COMORBIDITY AND BODY MASS INDEX (BMI) OF 34.0 TO 34.9 IN ADULT, UNSPECIFIED OBESITY TYPE: ICD-10-CM

## 2025-02-10 NOTE — TELEPHONE ENCOUNTER
Requesting zepbound 10mg  LOV: 10/23/24  RTC: 4 months  Filled: 11/23/24 #2ml with 2 refills    Future Appointments   Date Time Provider Department Center   3/24/2025  3:40 PM Giana Peña APRN [id number]   4/22/2025  9:15 AM Iveth Montero DO [facility]   7/28/2025  8:40 AM Giana Peña APRN [id number]

## 2025-02-11 RX ORDER — TIRZEPATIDE 10 MG/.5ML
10 INJECTION, SOLUTION SUBCUTANEOUS WEEKLY
Qty: 2 ML | Refills: 1 | Status: SHIPPED | OUTPATIENT
Start: 2025-02-11

## 2025-02-17 NOTE — LETTER
10/30/20    Patient: Quentin Segovia  : 1980 Visit date: 10/30/2020    Dear  Veena Emerson DO    Thank you for referring Quentin Segovia to my practice. Please find my assessment and plan below.     Assessment   Internal hemorrhoids with com Patient's son called requesting acute office visit  Patient c/o increased dyspnea- cannot make it to the bathroom by himself  Son states heart rate has been 190s    Advised son to take patient to ED for evaluation  He verbalized understanding

## 2025-03-24 ENCOUNTER — PATIENT MESSAGE (OUTPATIENT)
Dept: INTERNAL MEDICINE CLINIC | Facility: CLINIC | Age: 45
End: 2025-03-24

## 2025-04-16 ENCOUNTER — OFFICE VISIT (OUTPATIENT)
Dept: INTERNAL MEDICINE CLINIC | Facility: CLINIC | Age: 45
End: 2025-04-16
Payer: COMMERCIAL

## 2025-04-16 VITALS
HEIGHT: 75 IN | HEART RATE: 66 BPM | WEIGHT: 269 LBS | BODY MASS INDEX: 33.45 KG/M2 | OXYGEN SATURATION: 99 % | SYSTOLIC BLOOD PRESSURE: 124 MMHG | DIASTOLIC BLOOD PRESSURE: 86 MMHG | RESPIRATION RATE: 12 BRPM

## 2025-04-16 DIAGNOSIS — E88.819 INSULIN RESISTANCE: ICD-10-CM

## 2025-04-16 DIAGNOSIS — Z51.81 ENCOUNTER FOR THERAPEUTIC DRUG MONITORING: Primary | ICD-10-CM

## 2025-04-16 DIAGNOSIS — G47.33 OSA ON CPAP: ICD-10-CM

## 2025-04-16 DIAGNOSIS — E78.2 MIXED HYPERLIPIDEMIA: ICD-10-CM

## 2025-04-16 DIAGNOSIS — Z86.39 HISTORY OF MORBID OBESITY: ICD-10-CM

## 2025-04-16 DIAGNOSIS — E66.811 CLASS 1 OBESITY WITH SERIOUS COMORBIDITY AND BODY MASS INDEX (BMI) OF 34.0 TO 34.9 IN ADULT, UNSPECIFIED OBESITY TYPE: ICD-10-CM

## 2025-04-16 PROCEDURE — 3074F SYST BP LT 130 MM HG: CPT | Performed by: NURSE PRACTITIONER

## 2025-04-16 PROCEDURE — 3008F BODY MASS INDEX DOCD: CPT | Performed by: NURSE PRACTITIONER

## 2025-04-16 PROCEDURE — 99213 OFFICE O/P EST LOW 20 MIN: CPT | Performed by: NURSE PRACTITIONER

## 2025-04-16 PROCEDURE — 3079F DIAST BP 80-89 MM HG: CPT | Performed by: NURSE PRACTITIONER

## 2025-04-16 RX ORDER — EFINACONAZOLE 100 MG/ML
SOLUTION TOPICAL
COMMUNITY
Start: 2025-03-14

## 2025-04-16 RX ORDER — TIRZEPATIDE 12.5 MG/.5ML
12.5 INJECTION, SOLUTION SUBCUTANEOUS WEEKLY
Qty: 2 ML | Refills: 0 | Status: CANCELLED | OUTPATIENT
Start: 2025-04-16 | End: 2025-05-08

## 2025-04-16 RX ORDER — CICLOPIROX OLAMINE 7.7 MG/100ML
SUSPENSION TOPICAL
COMMUNITY
Start: 2025-04-14

## 2025-04-16 NOTE — PATIENT INSTRUCTIONS
Continue making lifestyle changes that focus on good nutrition, regular exercise and stress management.    Medication Plan: Finish Zepbound 10 mg weekly and then 7 days after last dose switch to Wegovy 1.7 mg weekly - sent to [facility]. Send [employer] message after 3rd pen in box so I can send in the next dose. Provide status and home scale weight.    Tips while taking an injectable medication:    Be an intuitive eater. Listen to your hunger and fullness signals, stopping when you are full.  Consume protein and produce in your day, striving for a rainbow of color of produce.  Reduce portions to staring size of 1 cup size and check in with your gut to see if you are full. Set the timer to slow down your eating pace to allow for 15-20 minutes to complete a meal. Recommend following the \"2 bite rule\".  Reduce refined sugars and high fat foods, as they may contribute to greater side effects of nausea and heartburn.  Stop eating 3 hours before bedtime to allow your food to digest.  Remain hydrated with water or non caloric and non caffeine beverages.  Use over the counter praful lozenge/supplement to help reduce nausea if needed.  If you have been off your medication for more than 2 weeks please notify our office to determine next dosing, as return to previous dose may not be appropriate or tolerated.    Next steps to work on before next office visit include:  Referral placed to [facility] program called 60 in 60.    Biological Consciousness: Stress Management through Mindfulness and Body Awareness    by Consuelo Wolfe, MEd, NFPT, 200-YTT  OAC Winter 2016 @ [email or web address]    Stress can adversely affect our health and wellbeing, but what is actually taking place in the body that makes it so worrisome for our health? What exactly is stress, and how does it influence our biological systems? How can a  feeling cause an injury or illness? Most importantly, how can chronic stress be managed in order to prevent or reverse negative health effects?    Through mindfulness and breath awareness, the functioning of our entire biological system is shifted, enhancing our mood, decision making skills and improving our chance of experiencing stress. It also improves our perception of stress, or our “health locus of control.”    Nearly all individuals experience acute or chronic mental stress and accept it as a normal part of living in the current day and age. According to the American Psychological Association and the American [place] of Stress, the amount of perceived stress individuals face has increased exponentially in the past five years. Among the top stressors are money, work and personal health. In fact, 52 percent of Americans rated their health as a cause of stress.    Not only has the overall health of Americans steadily declined --as shown by recent research that indicates only 40 percent of Americans rated their health as very good or excellent -- but the level of perceived stress has increased three-fold. We have learned we have the greatest influence on our health, happiness and perceived stress than ever thought before.    Chronic stress has the ability to shift our biological functions from a state of wellness and balance, to a state of potential injury, illness and even chronic disease. Stress is commonly thought of as a negative occurrence leading to a range of uncomfortable emotions and symptoms. However, stress can also be the foundation for productivity and innovation. The main distinction lies within our perception and choice responses to the stress.    What is Stress then, If it Can be Both Helpful and Harmful?  Stress can be defined as any situation that disturbs the equilibrium of a living organism (including plants and animals) and forces them to adapt or change. Mental stress can be any situation  in which environmental demands - internal demands or both - tax or exceed the adaptive resources of an individual, social system or physical tissue system.    Mental Stress  Mental stress is an internal experience; an experience that interferes with the entirety of our physiological makeup. Not only does stress challenge our mental capacity to adapt, as well as our current state of happiness, it also directly influences our homeostasis (balance). An individual’s belief in the dominance they have over stress also plays a major factor in mental, physical and emotional responses to stress.    These beliefs are what form an individual’s “health locus of control” (the perceived control over health outcomes). Those with a high external health locus of control perceive stress and their response to stress as being out of their control, whereas those with a high internal health locus of control believe that stress responses are influenced at their will. Studies indicate that individuals who have a high external locus of control experience worse health outcomes than those who feel that their health is within their control.    In order for the physical effects of mental stress to be understood, we must start within our internal make up, originating with the tiniest of cells. The human body is made up of trillions of cells, each conducting a magnetic field of energy. The energetic pulse of excitable tissue (i.e. nerves, muscles, etc.) is essentially what brings life to our bodies. Internally, this force field stimulates the function of our metabolism, digestive system, heart rate, hormone production, immunity and even our nervous system’s responses.    The consequences of chronic stress on our energy systems can lead to unfavorable health effects. Symptoms of stress such as elevated heart rate, increased blood pressure, slowed metabolism, tension headaches and anxiety can lead to more severe chronic illnesses. Maintaining a  constant level of stress for an extended period of time can trigger adverse health outcomes such as heart disease, weight gain, chronic migraines and even cancer, to name a few.    Physical Stress  So, how does a feeling resonate physically in the body, and what is taking place to decrease our health? The beginning of our internal reactions to stress originates in the brain. The hypothalamus, located in the lower midbrain, is responsible for our primal and instinctual responses. When under stress, the hypothalamus first alarms the body of potential harm by sending nerve signals to the adrenal glands, located above the kidneys. These glands then release a surge of hormones including adrenaline (epinephrine), and cortisol. This communication prompts a series of physical responses including elevated heart rate, increased skeletal muscle blood flow and behavior activation.    Our primal instincts driven by the lower-mid brain take over functioning and instinctual decision making through the influence of the sympathetic nervous system (SNS) (which is responsible for the ‘fight or flight’ response). When operating through the SNS, numerous bodily systems are shut down to focus only on necessary functions. For instance, thyroid functioning is working at a slower pace, insulin production is decreased and blood flow to the digestive system is less, resulting in slower metabolism and reduced nutritional intake. When cortisol is being released during this state, fat storage is increased in order to ensure survival.    This phenomenon is a factor contributing to the current epidemic of obesity. In females, stress has also been linked with the disruption of the normal menstrual cycle. Prolonged exposure to stress can lead to severe sexual malfunction including the inability to ovulate, menstrual irregularities, infertility and the complete impairment of reproductive functions.    Managing Your Stress with Mindfulness  So, how  can stress be managed, reduced or even eliminated, in order to reverse or halt the harmful effects of it? The power to influence your reactions to stress and build resilience can be achieved through mindfulness, self-awareness, acknowledging our own intuitive guidance system and practicing stress management techniques such as yoga and meditation, amongst others. Mind and body awareness allows individuals to influence their susceptibility to stress or trauma on the physical body.    Understanding and expanding our health locus of control by challenging negative thoughts has long been used to decrease stress and improve quality of life. Fortunately, mindful awareness can be practiced and learned no matter where you are in your current state of stress! In this context, mindfulness refers in part to the heightened awareness of an outside stress as the first step toward relaxing, in a way that can minimize the effects of that stress on the body.    Practicing Breath Awareness  The foundation for a healthier condition of living can be achieved most readily through breath awareness. Simply taking the time to breathe with awareness has the power to shift our functioning from the SNS to the parasympathetic nervous system (PNS), the system responsible for the ‘rest and digest’ activity within the body. When in this state, the body is able to restore its normal function, which stops the production of stress hormones, lowers blood pressure, increases lung capacity and calms the nervous system.    The taxing demands of our environment make it difficult to believe that something as simple as breath focus can influence so powerfully our state of mind. However, breath awareness and meditation are some of the most solid tools available to conquer stress. Mindfulness involves stopping, paying attention and becoming aware of the present moment’s realities in a non-judgmental way.    Practicing deep breathing improves blood flow to the  intuitive prefrontal cortex of the brain, thereby enhancing your mood and decision making skills instantaneously. When we are able to think more clearly, and reroute our energy in a positive direction, we experience the power of our own mental capacity and are offered a moment to choose a reaction to a given situation. This simple practice is unfortunately underutilized, even though it is available to everyone, every day. The self-awareness found in the breath offers opportunities for reflection, contemplation, and guidance towards our own moral compass. When we’re self-aware, we understand the role we play in choosing our perceptions, and the deliberate creation of our own reality.    As we learn to shift our focus to what is under our control, rather than what is not, we enhance our internal health locus of control. This empowering shift reveals the choice we have concerning how long we decide to be confined by the emotional effects of stress, and is also determines the severity of our biological responses to the stress. Breath is the optimal place to begin when cultivating a greater sense of self, because it brings your mind into the present moment and away from fear of the future. Accompanied with exercise, positive social circles and nutritionally balanced diets, mindfulness fosters a greater quality of life.    Conclusion  The connection between stress and the onset or manifestation of physical ailments has more recently been accepted in Western culture. By expanding our health locus of control, the biological responses to stress and our influence of these responses through mindful breath awareness can significantly reduce our risks for the adverse health effects of stress. However, stress need not be present in order to increase your quality of life through mindfulness.    Mindfulness, mediation, a healthy active lifestyle and compassion for yourself encourages a joyful experience in life, and can be  practiced during even the busiest times in your life. We are in control of how we perceive and respond to situations, and by practicing the shift of our focus from negative to positive while remaining in the present moment, we’ll see our lives be enriched through the power of choice with happiness beyond our greatest expectations.    [place].      Mindful eating takes practice to build a life long habit. Often we want to eat but not for true hunger, rather it's triggered by emotional/physical or environmental reasons. Check out [employer] website for tools and tips as well as an jason for daily support. Beyond these tools counseling can be an option to help support behavior change.    Get In Touch With Your Appetite- Hunger Cues  There are many signals that tell us it's time to eat (other than a rumbling stomach): television ads, social events, smells from the food court, and the candy bowl at the office. These factors in the environment trigger our senses and other mental processes that make us think we are hungry even when we’re not.  **The Hunger Rating Scale can help you decide if you are experiencing real hunger.  Remember that physical hunger builds gradually over time (usually over several hours after a meal), whereas emotional eating and cravings usually come on very suddenly. When you are genuinely hungry, you may experience one or several of the symptoms listed below:  Stomach pangs or growling   Emptiness in the stomach   Irritability   Headache   Low energy/fatigue   Difficulty concentrating  How Does the Scale Work?  Before you eat, take a moment to rate your hunger. Think about how hungry you physically feel. Your goal is to eat between levels 4 and 6. This means you are eating when you are hungry but stopping when you are comfortably full.  Try not to put off eating for too long. Waiting until level 1 or 2 -- when you are starving and unable to concentrate -- may lead to overeating. When you  first start to feel any of the symptoms listed above, you should probably start to think about eating.  We often let the sight of food tempt us when we are above a level 6 on the scale. Before you indulge, take a step back and think about how you feel. Did you just eat a few minutes ago? Are you eating in response to an emotion or because you are experiencing physical hunger?  Think of alternatives to eating for when these temptations arise. Some ideas are:  Drink a glass of cold water or another zero-calorie beverage   Take a walk to change the scenery   Do another form of exercise (sit-ups, running, swimming, tennis, etc.)   Call/text a friend or family member   Play a game with someone else  **   Hunger-Satiety Rating Scale    Full - 10        10 = Stuffed to the point of feeling sick   9 = Very uncomfortably full, need to loosen your belt    8 = Uncomfortably full, feel stuffed    7 = Very full, feel as if you have overeaten    6 = Comfortably full, satisfied    Neutral - 5    5 = Comfortable, neither hungry nor full   4 = Beginning signs and symptoms of hunger    3 = Hungry with several hunger symptoms, ready to eat    2 = Very hungry, unable to concentrate    Hungry - 1    1 = Starving, dizzy, irritable     ___________________________________________________________________  Taken from the American Diabetes Association @ www.diabetes.org.  Last Edited: March 19, 2014, reviewed December 17, 2013    Emotional Eating and Overeating   You have to know how to stop emotional eating and stop overeating if you want to lose weight and keep it off successfully.  Emotional eating and overeating can’t really satisfy an insatiable appetite anyway.  And whether or not you’ve been trying to use emotional eating to soothe feelings of stress, depression, loneliness, frustration or boredom, in the long run, overeating to feed those feelings only makes them worse.  But learning how to stop overeating and control emotional eating  can support healthy permanent weight loss and make you feel powerful.  Stop Emotional Eating - Don’t Use Foods to Soothe Moods  You probably already know that overeating high-fat, high-calorie, sweet, salty and unhealthy bad carbs won’t fill that empty void inside for long.  But what can you do to learn how to stop emotional overeating?  Most of us learn emotional eating at a very young age. We get into the habit of using food to sooth stressful feelings, alleviate boredom, reward and comfort ourselves, boost our sprits and celebrate with others.  But even though almost everyone’s overeating, you don’t have to. If you’re ready to take that old-frenzied feed-your-feelings bull by the horns, here’s our basic 12 step program for how to stop emotional eating.  1. Make a commitment. Like any established bad habit, nothing will change unless you make a commit to changing your behavior.  2. Practice awareness. To be more conscious of what’s happening, jot down when and what you eat and how you feel before and afterwards.  3. Manage your stress. Healthy emotional distress management is an important life skill. Positive ways to reduce stress include regular exercise, relaxation techniques and getting support from family and friends.  4. Be physically active. Exercise reduces stress and is a great mood enhancer too. So be sure you make time for regular physical activity.  5. Create new comforts. Make a list of healthy activities you enjoy. And, whenever you feel the need, treat yourself to something on your list.  6. Start eating healthier. When you eat for health you’ll choose more high fiber foods, such as vegetables, beans, whole grains and fresh fruits, plus healthy high protein foods, like fish, lean poultry and low-fat dairy.  7. Eat mini-meals often. By eating 5 or 6 small healthy meals a day, including breakfast, you help keep your blood sugar and moods stable.  8. Get rid of temptations. Don’t keep unhealthy food in  the house, don’t shop for food when hungry or stressed and plan ahead before eating out.  9. Get enough sleep. When you’re tired, it’s easier to give in to emotional eating. Consider taking a nap and be sure to get a good nights sleep.  10. Use healthy distraction. Instead of overeating, take a walk, surf the Internet, pet your cat or dog, listen to music, enjoy a warm bath, read a good book, watch a movie, work in the garden or talk to a friend.  11. Practice mindfulness. Mindful eating means paying attention to the act of eating and observing your thoughts and feelings in the process.  12. Get some support. It’s easier to control emotional eating if you have a support network of friends or family. And if no one you know is supportive, make some new health-oriented friends or join a support group.  Learning how to stop emotional eating and overeating is a life-changing experience. Just make sure to stay on track and enjoy the journey.    Posted By Kai Tierney On December 27, 2015 @ 11:33 am In Healthy Living. Taken from www.[employer]      Comfort Foods - Why do they make us happy?    by Nicolasa Tijerina, PhD, HSPP    OAC @ www.obesityaction.org Summer 2013 Resources    “There, there. Just let me bake you some cookies.” “Don’t cry. We’ll stop and get some ice cream, and it will all be better.”    Do these sound familiar? Maybe statements that you heard as a young child? They were innocent words and very genuine actions on the part of our caregivers to express love and concern to us when we were hurting. The way they knew to do this was through food - and not just any food. Usually, the foods offered were foods rich in fats and carbohydrates - the foods that we have come to term “comfort foods.” In this way, food has come to be used as a special type of medicine, as an anti-depressant of types, to cure the mood that ails us. However, such patterns can become very problematic, especially if it is a habitual  pattern causing excessive weight gain.    Scientists continue to research the effect that the chemical composition of foods may have on our moods. While such research is very valuable, I want to focus on the psychology, not the biology, of comfort food.    Food and Customs  All cultures have customs around food. In my childhood, money was scarce and when there was some kind of special occasion, it meant that we would use limited resources to buy special foods. It meant that we were being treated in a special way. Birthdays meant choosing a special meal and a type of cake and ice cream (within a budget). Funerals involved having food brought to the bereaved. The  ritual is one of special interest to this topic. The message is quite obvious: “I hope this food makes you happier.” Again, nothing is ill-intended and the gift is given with much love and care. However, it is another reinforcement of the use of food to make us “feel better.”    We are given messages early in our lives and then reinforced throughout our lives about how food can make us feel different, to feel better. Because we equate food with happiness, we continue to turn to food for such comfort. And we do feel happy or better, albeit temporarily.    Changing Patterns  1. The key to changing this lifelong pattern of equating food with happiness is to first be aware. Take some time to reflect on how food was used through your life and its connection to emotional states for you.    2. Next, take some time to reflect on your own emotional states. You may keep a feeling journal and write down how you felt each day. In reflecting, you will be more aware of the connection of food to your feelings in the past and more aware of your feelings in the present.    3. Then, the work begins. Take each emotion connected to food and create a list of other things you may do to tend to that emotion. For instance, you may have “sadness” as one emotion that has been  connected to eating. Alternative ways to get comfort when sad may be:    Talk to a friend  Cry  Journal your feelings  Listen to music  Write a song or a poem    4. By creating alternatives, you begin to see how you can break the cycle of comfort eating.    5. Post this list of alternatives in a place that you are likely to see it regularly. Consult it. Add to it as needed. Or dorcas things off that you have tried that maybe didn’t work.    Food and Behavioral Conditioning  One important means of understanding the connection between food and behavior is understanding how we are conditioned to have a certain response when we are exposed repeatedly to a stimuli. In this case, when we have been told repeatedly that we can feel better with food (the stimuli), we believe that we do indeed feel better (the response) when we eat cakes and cookies and such. However, what we don’t think about is the other stimulus, the care, the concern, the love that came with the food and how that made us feel. In other words, we may have attributed our response (feeling better) to the wrong stimulus (food) rather than the one that actually did make us feel better, which was the love we felt.    So maybe it is not the food that makes us feel happy. Maybe it is the memory of these people expressing their love and care to us. Maybe that is what really makes us happy. Patients often tell me that they eat when sad, lonely or bored. They are seeking comfort.    They want to feel “full” or “satisfied” and the food does offer that physical release. However, the true comfort that they seek cannot be found in carbohydrates or fat, but it can be found in the feeling of belonging, of connecting with others, of being creative and inspired.    Conclusion  Remember, you have had a lifetime of creating a pattern of using comfort foods, so it is not likely to change quickly. Make sure to give yourself some time to make these changes. When you are able to  change the relationship with food, you are able to change your relationships with others, and you just might find more satisfying and healthier relationships.

## 2025-04-16 NOTE — PROGRESS NOTES
Johan Solomon is a 44 year old male presents today for f/u on medical weight loss program for the treatment of overweight, obesity, or morbid obesity with associated hyperlipidemia, insulin resistance, JUDY.    S:  Current weight   Wt Readings from Last 6 Encounters:   04/16/25 269 lb (122 kg)   10/23/24 276 lb (125.2 kg)   10/22/24 276 lb (125.2 kg)   10/17/24 272 lb 11.2 oz (123.7 kg)   06/19/24 296 lb (134.3 kg)   03/01/24 (!) 311 lb (141.1 kg)    AND BMI Body mass index is 33.62 kg/m²..    Patient has lost 7# since LOV in 10/2024. He has been consistent with medication. Had left foot surgery with some exercise restrictions, but now lifted. Stress high which has contributed to stress eating and at times social isolation. Continues to work with a counselor. No lifestyle log completed.    Testing/consult completed since LOV: Dietician: marlen     [facility] Medical Weight Loss Follow Up    Question 10/21/2024  9:43 PM CDT - Filed by Patient   Please describe a success moment: 74 lbs lost   Please describe a challenging moment/needs for improvement: Getting gastro problems   Please complete this 24 hour food journal, listing everything you had to eat in the past day. Include the average time of day you ate these meals at    List foods, qty and prep for breakfast: 2egg 1 toast coffdd   List foods, qty and prep for lunch. Half turkey sandwich   List foods, qty and prep for dinner. [place], green beans   List foods, qty and prep for snacks. None   List the types and qty of fluids consumed Water, coffee, milk   On average, how many meals did you eat out per week? 2   Exercise    How many days per week are you active or exercise 2   On average, how many days were anaerobic (strength/resistance) exercises performed? 0   On average, how many days were aerobic (cardio) exercises performed? 1   Perceived level of exertion on a scale of 1-5, with 5 being very intense: 2   Stress    Average stress level on a scale of 1-10, with 10  being extremely stressed: 7   If greater than 5/1O how would you grade your coping mechanisms? moderate   Sleep hours and integrity    How many hours of uninterrupted sleep do you get a night: 6   Do you feel rested in the morning: Yes   If no, what may have been disrupting your sleep? Wife alarms   Please list any goal(s) for your next visit Keep winning     Social hx and PMH reviewed. Employed from home, sedentary.  with 3 children. Good spouse support. Hx of PTSD.    REVIEW OF SYSTEMS:  GENERAL: feels well otherwise  LUNGS: denies shortness of breath with exertion  CARDIOVASCULAR: denies chest pain on exertion, denies palpitations or pedal edema  GI: denies abdominal pain.  No N/V/D/C.  MUSCULOSKELETAL: chronic knee pains reduced significantly  NEURO: denies headaches  PSYCH: denies change in behavior or mood, denies feeling sad or depressed.    EXAM:  /86   Pulse 66   Resp 12   Ht 6' 3\" (1.905 m)   Wt 269 lb (122 kg)   SpO2 99%   BMI 33.62 kg/m²   GENERAL: well developed, well nourished, in no apparent distress, obese  EYES: conjunctiva pink, sclera non icteric, PERRLA  LUNGS: Breathing non labored. CTA in all fields  CARDIO: RRR without murmur, normal S1 and S2 without clicks or gallops, no pedal edema.  GI: +BS  NEURO/MS: motor and sensory grossly intact  PSYCH: pleasant, cooperative, normal mood and affect    ASSESSMENT AND PLAN:  Reviewed Initial Weight Data and Goal Weight Loss:       Encounter Diagnoses   Name Primary?    Encounter for therapeutic drug monitoring Yes    Class 1 obesity with serious comorbidity and body mass index (BMI) of 34.0 to 34.9 in adult, unspecified obesity type     JUDY on CPAP     Mixed hyperlipidemia     Insulin resistance     History of morbid obesity                No orders of the defined types were placed in this encounter.      Meds & Refills for this Visit:  Requested Prescriptions     Signed Prescriptions Disp Refills    semaglutide-weight management 1.7  MG/0.75ML Subcutaneous Solution Auto-injector 3 mL 0     Sig: Inject 0.75 mL (1.7 mg total) into the skin once a week.       Imaging & Consults:  OP REFERRAL [facility]      Plan:  Patient has lost 7# since LOV in 10/2024 on Zepbound 10 mg weekly with a total weight loss of 61# since initial consult on 6/9/2021 with initial weight of 330#.  Weight loss goal: lose weight to help reduce LBP. Switch to Wegovy 1.7 mg weekly as directed for cost savings. Hx of Ozempic with GI SEs. Hx of Metformin ER. No AOM coverage.  on mindfulness. Refer to [facility]. See patient instructions below for additional plans and patient counseling.      Patient Instructions   Continue making lifestyle changes that focus on good nutrition, regular exercise and stress management.    Medication Plan: Finish Zepbound 10 mg weekly and then 7 days after last dose switch to Wegovy 1.7 mg weekly - sent to [facility]. Send Sendia message after 3rd pen in box so I can send in the next dose. Provide status and home scale weight.    Tips while taking an injectable medication:    Be an intuitive eater. Listen to your hunger and fullness signals, stopping when you are full.  Consume protein and produce in your day, striving for a rainbow of color of produce.  Reduce portions to staring size of 1 cup size and check in with your gut to see if you are full. Set the timer to slow down your eating pace to allow for 15-20 minutes to complete a meal. Recommend following the \"2 bite rule\".  Reduce refined sugars and high fat foods, as they may contribute to greater side effects of nausea and heartburn.  Stop eating 3 hours before bedtime to allow your food to digest.  Remain hydrated with water or non caloric and non caffeine beverages.  Use over the counter praful lozenge/supplement to help reduce nausea if needed.  If you have been off your medication for more than 2 weeks please notify our office to determine next dosing, as return to previous  dose may not be appropriate or tolerated.    Next steps to work on before next office visit include:  Referral placed to [facility] program called 60 in 60.    Biological Consciousness: Stress Management through Mindfulness and Body Awareness    by Consuelo Wolfe, MEd, NFPT, 200-YTT  OAC Winter 2016 @ [email or web address]-[email or web address]-mindfulness-[email or web address]-body-[email or web address]    Stress can adversely affect our health and wellbeing, but what is actually taking place in the body that makes it so worrisome for our health? What exactly is stress, and how does it influence our biological systems? How can a feeling cause an injury or illness? Most importantly, how can chronic stress be managed in order to prevent or reverse negative health effects?    Through mindfulness and breath awareness, the functioning of our entire biological system is shifted, enhancing our mood, decision making skills and improving our chance of experiencing stress. It also improves our perception of stress, or our “health locus of control.”    Nearly all individuals experience acute or chronic mental stress and accept it as a normal part of living in the current day and age. According to the American Psychological Association and the American [place] of Stress, the amount of perceived stress individuals face has increased exponentially in the past five years. Among the top stressors are money, work and personal health. In fact, 52 percent of Americans rated their health as a cause of stress.    Not only has the overall health of Americans steadily declined --as shown by recent research that indicates only 40 percent of Americans rated their health as very good or excellent -- but the level of perceived stress has increased three-fold. We have learned we have the greatest influence on our health, happiness and perceived stress than ever thought before.    Chronic stress has the ability  to shift our biological functions from a state of wellness and balance, to a state of potential injury, illness and even chronic disease. Stress is commonly thought of as a negative occurrence leading to a range of uncomfortable emotions and symptoms. However, stress can also be the foundation for productivity and innovation. The main distinction lies within our perception and choice responses to the stress.    What is Stress then, If it Can be Both Helpful and Harmful?  Stress can be defined as any situation that disturbs the equilibrium of a living organism (including plants and animals) and forces them to adapt or change. Mental stress can be any situation in which environmental demands - internal demands or both - tax or exceed the adaptive resources of an individual, social system or physical tissue system.    Mental Stress  Mental stress is an internal experience; an experience that interferes with the entirety of our physiological makeup. Not only does stress challenge our mental capacity to adapt, as well as our current state of happiness, it also directly influences our homeostasis (balance). An individual’s belief in the dominance they have over stress also plays a major factor in mental, physical and emotional responses to stress.    These beliefs are what form an individual’s “health locus of control” (the perceived control over health outcomes). Those with a high external health locus of control perceive stress and their response to stress as being out of their control, whereas those with a high internal health locus of control believe that stress responses are influenced at their will. Studies indicate that individuals who have a high external locus of control experience worse health outcomes than those who feel that their health is within their control.    In order for the physical effects of mental stress to be understood, we must start within our internal make up, originating with the tiniest of  cells. The human body is made up of trillions of cells, each conducting a magnetic field of energy. The energetic pulse of excitable tissue (i.e. nerves, muscles, etc.) is essentially what brings life to our bodies. Internally, this force field stimulates the function of our metabolism, digestive system, heart rate, hormone production, immunity and even our nervous system’s responses.    The consequences of chronic stress on our energy systems can lead to unfavorable health effects. Symptoms of stress such as elevated heart rate, increased blood pressure, slowed metabolism, tension headaches and anxiety can lead to more severe chronic illnesses. Maintaining a constant level of stress for an extended period of time can trigger adverse health outcomes such as heart disease, weight gain, chronic migraines and even cancer, to name a few.    Physical Stress  So, how does a feeling resonate physically in the body, and what is taking place to decrease our health? The beginning of our internal reactions to stress originates in the brain. The hypothalamus, located in the lower midbrain, is responsible for our primal and instinctual responses. When under stress, the hypothalamus first alarms the body of potential harm by sending nerve signals to the adrenal glands, located above the kidneys. These glands then release a surge of hormones including adrenaline (epinephrine), and cortisol. This communication prompts a series of physical responses including elevated heart rate, increased skeletal muscle blood flow and behavior activation.    Our primal instincts driven by the lower-mid brain take over functioning and instinctual decision making through the influence of the sympathetic nervous system (SNS) (which is responsible for the ‘fight or flight’ response). When operating through the SNS, numerous bodily systems are shut down to focus only on necessary functions. For instance, thyroid functioning is working at a slower pace,  insulin production is decreased and blood flow to the digestive system is less, resulting in slower metabolism and reduced nutritional intake. When cortisol is being released during this state, fat storage is increased in order to ensure survival.    This phenomenon is a factor contributing to the current epidemic of obesity. In females, stress has also been linked with the disruption of the normal menstrual cycle. Prolonged exposure to stress can lead to severe sexual malfunction including the inability to ovulate, menstrual irregularities, infertility and the complete impairment of reproductive functions.    Managing Your Stress with Mindfulness  So, how can stress be managed, reduced or even eliminated, in order to reverse or halt the harmful effects of it? The power to influence your reactions to stress and build resilience can be achieved through mindfulness, self-awareness, acknowledging our own intuitive guidance system and practicing stress management techniques such as yoga and meditation, amongst others. Mind and body awareness allows individuals to influence their susceptibility to stress or trauma on the physical body.    Understanding and expanding our health locus of control by challenging negative thoughts has long been used to decrease stress and improve quality of life. Fortunately, mindful awareness can be practiced and learned no matter where you are in your current state of stress! In this context, mindfulness refers in part to the heightened awareness of an outside stress as the first step toward relaxing, in a way that can minimize the effects of that stress on the body.    Practicing Breath Awareness  The foundation for a healthier condition of living can be achieved most readily through breath awareness. Simply taking the time to breathe with awareness has the power to shift our functioning from the SNS to the parasympathetic nervous system (PNS), the system responsible for the ‘rest and  digest’ activity within the body. When in this state, the body is able to restore its normal function, which stops the production of stress hormones, lowers blood pressure, increases lung capacity and calms the nervous system.    The taxing demands of our environment make it difficult to believe that something as simple as breath focus can influence so powerfully our state of mind. However, breath awareness and meditation are some of the most solid tools available to conquer stress. Mindfulness involves stopping, paying attention and becoming aware of the present moment’s realities in a non-judgmental way.    Practicing deep breathing improves blood flow to the intuitive prefrontal cortex of the brain, thereby enhancing your mood and decision making skills instantaneously. When we are able to think more clearly, and reroute our energy in a positive direction, we experience the power of our own mental capacity and are offered a moment to choose a reaction to a given situation. This simple practice is unfortunately underutilized, even though it is available to everyone, every day. The self-awareness found in the breath offers opportunities for reflection, contemplation, and guidance towards our own moral compass. When we’re self-aware, we understand the role we play in choosing our perceptions, and the deliberate creation of our own reality.    As we learn to shift our focus to what is under our control, rather than what is not, we enhance our internal health locus of control. This empowering shift reveals the choice we have concerning how long we decide to be confined by the emotional effects of stress, and is also determines the severity of our biological responses to the stress. Breath is the optimal place to begin when cultivating a greater sense of self, because it brings your mind into the present moment and away from fear of the future. Accompanied with exercise, positive social circles and nutritionally  balanced diets, mindfulness fosters a greater quality of life.    Conclusion  The connection between stress and the onset or manifestation of physical ailments has more recently been accepted in Western culture. By expanding our health locus of control, the biological responses to stress and our influence of these responses through mindful breath awareness can significantly reduce our risks for the adverse health effects of stress. However, stress need not be present in order to increase your quality of life through mindfulness.    Mindfulness, mediation, a healthy active lifestyle and compassion for yourself encourages a joyful experience in life, and can be practiced during even the busiest times in your life. We are in control of how we perceive and respond to situations, and by practicing the shift of our focus from negative to positive while remaining in the present moment, we’ll see our lives be enriched through the power of choice with happiness beyond our greatest expectations.    [place].      Mindful eating takes practice to build a life long habit. Often we want to eat but not for true hunger, rather it's triggered by emotional/physical or environmental reasons. Check out www.[employer] website for tools and tips as well as an jason for daily support. Beyond these tools counseling can be an option to help support behavior change.    Get In Touch With Your Appetite- Hunger Cues  There are many signals that tell us it's time to eat (other than a rumbling stomach): television ads, social events, smells from the food court, and the candy bowl at the office. These factors in the environment trigger our senses and other mental processes that make us think we are hungry even when we’re not.  **The Hunger Rating Scale can help you decide if you are experiencing real hunger.  Remember that physical hunger builds gradually over time (usually over several hours after a meal), whereas emotional eating and cravings  usually come on very suddenly. When you are genuinely hungry, you may experience one or several of the symptoms listed below:  Stomach pangs or growling   Emptiness in the stomach   Irritability   Headache   Low energy/fatigue   Difficulty concentrating  How Does the Scale Work?  Before you eat, take a moment to rate your hunger. Think about how hungry you physically feel. Your goal is to eat between levels 4 and 6. This means you are eating when you are hungry but stopping when you are comfortably full.  Try not to put off eating for too long. Waiting until level 1 or 2 -- when you are starving and unable to concentrate -- may lead to overeating. When you first start to feel any of the symptoms listed above, you should probably start to think about eating.  We often let the sight of food tempt us when we are above a level 6 on the scale. Before you indulge, take a step back and think about how you feel. Did you just eat a few minutes ago? Are you eating in response to an emotion or because you are experiencing physical hunger?  Think of alternatives to eating for when these temptations arise. Some ideas are:  Drink a glass of cold water or another zero-calorie beverage   Take a walk to change the scenery   Do another form of exercise (sit-ups, running, swimming, tennis, etc.)   Call/text a friend or family member   Play a game with someone else  **   Hunger-Satiety Rating Scale    Full - 10        10 = Stuffed to the point of feeling sick   9 = Very uncomfortably full, need to loosen your belt    8 = Uncomfortably full, feel stuffed    7 = Very full, feel as if you have overeaten    6 = Comfortably full, satisfied    Neutral - 5    5 = Comfortable, neither hungry nor full   4 = Beginning signs and symptoms of hunger    3 = Hungry with several hunger symptoms, ready to eat    2 = Very hungry, unable to concentrate    Hungry - 1    1 = Starving, dizzy, irritable      ___________________________________________________________________  Taken from the American Diabetes Association @ www.diabetes.org.  Last Edited: March 19, 2014, reviewed December 17, 2013    Emotional Eating and Overeating   You have to know how to stop emotional eating and stop overeating if you want to lose weight and keep it off successfully.  Emotional eating and overeating can’t really satisfy an insatiable appetite anyway.  And whether or not you’ve been trying to use emotional eating to soothe feelings of stress, depression, loneliness, frustration or boredom, in the long run, overeating to feed those feelings only makes them worse.  But learning how to stop overeating and control emotional eating can support healthy permanent weight loss and make you feel powerful.  Stop Emotional Eating - Don’t Use Foods to Soothe Moods  You probably already know that overeating high-fat, high-calorie, sweet, salty and unhealthy bad carbs won’t fill that empty void inside for long.  But what can you do to learn how to stop emotional overeating?  Most of us learn emotional eating at a very young age. We get into the habit of using food to sooth stressful feelings, alleviate boredom, reward and comfort ourselves, boost our sprits and celebrate with others.  But even though almost everyone’s overeating, you don’t have to. If you’re ready to take that old-frenzied feed-your-feelings bull by the georgina, here’s our basic 12 step program for how to stop emotional eating.  1. Make a commitment. Like any established bad habit, nothing will change unless you make a commit to changing your behavior.  2. Practice awareness. To be more conscious of what’s happening, jot down when and what you eat and how you feel before and afterwards.  3. Manage your stress. Healthy emotional distress management is an important life skill. Positive ways to reduce stress include regular exercise, relaxation techniques and getting support from family  and friends.  4. Be physically active. Exercise reduces stress and is a great mood enhancer too. So be sure you make time for regular physical activity.  5. Create new comforts. Make a list of healthy activities you enjoy. And, whenever you feel the need, treat yourself to something on your list.  6. Start eating healthier. When you eat for health you’ll choose more high fiber foods, such as vegetables, beans, whole grains and fresh fruits, plus healthy high protein foods, like fish, lean poultry and low-fat dairy.  7. Eat mini-meals often. By eating 5 or 6 small healthy meals a day, including breakfast, you help keep your blood sugar and moods stable.  8. Get rid of temptations. Don’t keep unhealthy food in the house, don’t shop for food when hungry or stressed and plan ahead before eating out.  9. Get enough sleep. When you’re tired, it’s easier to give in to emotional eating. Consider taking a nap and be sure to get a good nights sleep.  10. Use healthy distraction. Instead of overeating, take a walk, surf the Internet, pet your cat or dog, listen to music, enjoy a warm bath, read a good book, watch a movie, work in the garden or talk to a friend.  11. Practice mindfulness. Mindful eating means paying attention to the act of eating and observing your thoughts and feelings in the process.  12. Get some support. It’s easier to control emotional eating if you have a support network of friends or family. And if no one you know is supportive, make some new health-oriented friends or join a support group.  Learning how to stop emotional eating and overeating is a life-changing experience. Just make sure to stay on track and enjoy the journey.    Posted By Kai Tierney On December 27, 2015 @ 11:33 am In Healthy Living. Taken from www.[employer].Webshoz      Comfort Foods - Why do they make us happy?    by Nicolasa Tijerina, PhD, HSPP    OAC @ www.obesityaction.org Summer 2013 Resources    “There, there. Just let me bake  you some cookies.” “Don’t cry. We’ll stop and get some ice cream, and it will all be better.”    Do these sound familiar? Maybe statements that you heard as a young child? They were innocent words and very genuine actions on the part of our caregivers to express love and concern to us when we were hurting. The way they knew to do this was through food - and not just any food. Usually, the foods offered were foods rich in fats and carbohydrates - the foods that we have come to term “comfort foods.” In this way, food has come to be used as a special type of medicine, as an anti-depressant of types, to cure the mood that ails us. However, such patterns can become very problematic, especially if it is a habitual pattern causing excessive weight gain.    Scientists continue to research the effect that the chemical composition of foods may have on our moods. While such research is very valuable, I want to focus on the psychology, not the biology, of comfort food.    Food and Customs  All cultures have customs around food. In my childhood, money was scarce and when there was some kind of special occasion, it meant that we would use limited resources to buy special foods. It meant that we were being treated in a special way. Birthdays meant choosing a special meal and a type of cake and ice cream (within a budget). Funerals involved having food brought to the bereaved. The  ritual is one of special interest to this topic. The message is quite obvious: “I hope this food makes you happier.” Again, nothing is ill-intended and the gift is given with much love and care. However, it is another reinforcement of the use of food to make us “feel better.”    We are given messages early in our lives and then reinforced throughout our lives about how food can make us feel different, to feel better. Because we equate food with happiness, we continue to turn to food for such comfort. And we do feel happy or better, albeit  temporarily.    Changing Patterns  1. The key to changing this lifelong pattern of equating food with happiness is to first be aware. Take some time to reflect on how food was used through your life and its connection to emotional states for you.    2. Next, take some time to reflect on your own emotional states. You may keep a feeling journal and write down how you felt each day. In reflecting, you will be more aware of the connection of food to your feelings in the past and more aware of your feelings in the present.    3. Then, the work begins. Take each emotion connected to food and create a list of other things you may do to tend to that emotion. For instance, you may have “sadness” as one emotion that has been connected to eating. Alternative ways to get comfort when sad may be:    Talk to a friend  Cry  Journal your feelings  Listen to music  Write a song or a poem    4. By creating alternatives, you begin to see how you can break the cycle of comfort eating.    5. Post this list of alternatives in a place that you are likely to see it regularly. Consult it. Add to it as needed. Or dorcas things off that you have tried that maybe didn’t work.    Food and Behavioral Conditioning  One important means of understanding the connection between food and behavior is understanding how we are conditioned to have a certain response when we are exposed repeatedly to a stimuli. In this case, when we have been told repeatedly that we can feel better with food (the stimuli), we believe that we do indeed feel better (the response) when we eat cakes and cookies and such. However, what we don’t think about is the other stimulus, the care, the concern, the love that came with the food and how that made us feel. In other words, we may have attributed our response (feeling better) to the wrong stimulus (food) rather than the one that actually did make us feel better, which was the love we felt.    So maybe it is not the food that  makes us feel happy. Maybe it is the memory of these people expressing their love and care to us. Maybe that is what really makes us happy. Patients often tell me that they eat when sad, lonely or bored. They are seeking comfort.    They want to feel “full” or “satisfied” and the food does offer that physical release. However, the true comfort that they seek cannot be found in carbohydrates or fat, but it can be found in the feeling of belonging, of connecting with others, of being creative and inspired.    Conclusion  Remember, you have had a lifetime of creating a pattern of using comfort foods, so it is not likely to change quickly. Make sure to give yourself some time to make these changes. When you are able to change the relationship with food, you are able to change your relationships with others, and you just might find more satisfying and healthier relationships.      Medication use and SEs reviewed with patient.    Return in about 6 months (around 10/16/2025) for weight management via clinic.    Patient verbalizes understanding.    DOCUMENTATION OF TIME SPENT: Code selection for this visit was based on time spent : 20 minutes on date of service in preparing to see the patient, obtaining and/or reviewing separately obtained history, performing a medically appropriate examination, counseling and educating the patient/family/caregiver, ordering medications or testing, referring and communicating with other healthcare providers, documenting clinical information in the electronic medical record, independently interpreting results and communicating results to the patient/family/caregiver and care coordination with the patient's other providers.

## 2025-04-17 ENCOUNTER — TELEPHONE (OUTPATIENT)
Dept: INTERNAL MEDICINE CLINIC | Facility: CLINIC | Age: 45
End: 2025-04-17

## 2025-04-17 NOTE — TELEPHONE ENCOUNTER
Pharmcy sent information stating wegovy 1.7 mg not covered  Call insurance   ID      [id number]   [facility] [id number]  PCN [id number]  GROUP [id number]    I tried to enter in epic with the right insurance and it shows it is not covered.

## 2025-04-22 ENCOUNTER — OFFICE VISIT (OUTPATIENT)
Dept: NEUROLOGY | Facility: CLINIC | Age: 45
End: 2025-04-22
Payer: COMMERCIAL

## 2025-04-22 VITALS
WEIGHT: 272 LBS | HEIGHT: 74 IN | BODY MASS INDEX: 34.91 KG/M2 | OXYGEN SATURATION: 99 % | DIASTOLIC BLOOD PRESSURE: 90 MMHG | HEART RATE: 77 BPM | SYSTOLIC BLOOD PRESSURE: 140 MMHG | RESPIRATION RATE: 16 BRPM

## 2025-04-22 DIAGNOSIS — R93.89 ABNORMAL COMPUTED TOMOGRAPHY ANGIOGRAPHY (CTA): ICD-10-CM

## 2025-04-22 DIAGNOSIS — G43.809 VESTIBULAR MIGRAINE: Primary | ICD-10-CM

## 2025-04-22 DIAGNOSIS — G43.709 CHRONIC MIGRAINE WITHOUT AURA WITHOUT STATUS MIGRAINOSUS, NOT INTRACTABLE: ICD-10-CM

## 2025-04-22 PROCEDURE — 3008F BODY MASS INDEX DOCD: CPT | Performed by: OTHER

## 2025-04-22 PROCEDURE — 3080F DIAST BP >= 90 MM HG: CPT | Performed by: OTHER

## 2025-04-22 PROCEDURE — 99214 OFFICE O/P EST MOD 30 MIN: CPT | Performed by: OTHER

## 2025-04-22 PROCEDURE — 3077F SYST BP >= 140 MM HG: CPT | Performed by: OTHER

## 2025-04-22 RX ORDER — BUTALBITAL, ACETAMINOPHEN AND CAFFEINE 50; 325; 40 MG/1; MG/1; MG/1
TABLET ORAL
Qty: 10 TABLET | Refills: 1 | Status: SHIPPED | OUTPATIENT
Start: 2025-04-22

## 2025-04-22 NOTE — PATIENT INSTRUCTIONS
Refill policies:    Allow 2-3 business days for refills; controlled substances may take longer.  Contact your pharmacy at least 5 days prior to running out of medication and have them send an electronic request or submit request through the “request refill” option in your [employer] account.  Refills are not addressed on weekends; covering physicians do not authorize routine medications on weekends.  No narcotics or controlled substances are refilled after noon on Fridays or by on call physicians.  By law, narcotics must be electronically prescribed.  A 30 day supply with no refills is the maximum allowed.  If your prescription is due for a refill, you may be due for a follow up appointment.  To best provide you care, patients receiving routine medications need to be seen at least once a year.  Patients receiving narcotic/controlled substance medications need to be seen at least once every 3 months.  In the event that your preferred pharmacy does not have the requested medication in stock (e.g. Backordered), it is your responsibility to find another pharmacy that has the requested medication available.  We will gladly send a new prescription to that pharmacy at your request.    Scheduling Tests:    If your physician has ordered radiology tests such as MRI or CT scans, please contact Central Scheduling at [phone] right away to schedule the test.  Once scheduled, the [facility] Centralized Referral Team will work with your insurance carrier to obtain pre-certification or prior authorization.  Depending on your insurance carrier, approval may take 3-10 days.  It is highly recommended patients assure they have received an authorization before having a test performed.  If test is done without insurance authorization, patient may be responsible for the entire amount billed.      Precertification and Prior Authorizations:  If your physician has recommended that you have a procedure or additional testing performed the [facility]  Centralized Referral Team will contact your insurance carrier to obtain pre-certification or prior authorization.    You are strongly encouraged to contact your insurance carrier to verify that your procedure/test has been approved and is a COVERED benefit.  Although the [facility] Centralized Referral Team does its due diligence, the insurance carrier gives the disclaimer that \"Although the procedure is authorized, this does not guarantee payment.\"    Ultimately the patient is responsible for payment.   Thank you for your understanding in this matter.  Paperwork Completion:  If you require FMLA or disability paperwork for your recovery, please make sure to either drop it off or have it faxed to our office at [phone]. Be sure the form has your name and date of birth on it.  The form will be faxed to our Forms Department and they will complete it for you.  There is a 25$ fee for all forms that need to be filled out.  Please be aware there is a 10-14 day turnaround time.  You will need to sign a release of information (SUNNY) form if your paperwork does not come with one.  You may call the Forms Department with any questions at [phone].  Their fax number is [phone].

## 2025-04-22 NOTE — PROGRESS NOTES
[facility]   Neurology    Johan Solomon Patient Status:  No patient class for patient encounter    1980 MRN [id number]   Location [facility] PCP None Pcp              HPI:   Johan Solomon is a(n) 44 year old male with history of anxiety, depression, migraines, JUDY, covid 19, who presents at the request of Dr. Grijalva for evaluation of vertigo. About a week ago he was at a [facility] facility around 5:30pm and became acutely dizzy, seemed lightheaded, and he was also sweating. Vision was crossing. Had last eaten lunch. Had not eaten much that day, last two days prior to that had watery diarrhea, vomiting. Felt slow, didn't know the rules of the games. Speech seemed slurred. BP was systolic 150's. Felt better later that evening, after IV fluids in an outside ED, next am felt ok, but symptoms returned later that morning. Had breakfast, then at work started having difficulty talking. Didn't remember names, sentences weren't complete, lasted a few minutes, then the room started spinning, horizontally, and then vertically. He fell because of it. Lasted 30 minutes. He has ran out of propranolol 3 weeks ago. Then developed a severe migraine. Vision was blurry. Throbbing, photophobia, phonophobia. This was the second ED visit. Gets migraines 2 times a year if taking propranolol. Restarted propranolol 80mg a few days ago. Since the episodes he has had tremulousness, and feels hollow.   Interim  Since last visit, had one episode of vertigo. Room moved left. Worse when looks right. Lasted minutes. Laid down and felt better. No headache at that time. Stress triggered it. This occurred around 2024. The last one occurred in November. Taking propranolol 80mg ER daily. Having migraines now once every 2 months.       Pertinent imaging and laboratory work-up:   CTA 2024  CONCLUSION:       1. No acute intracranial abnormality. If there is  clinical concern for acute ischemia/infarction, an MRI of the brain would be recommended for further evaluation.      2. No high-grade stenosis, occlusion, or dissection is identified within the major intracranial or cervical arterial vasculature.      3. 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 Yes    HEMOGLOBIN A1c      <5.7 % 5.4    ESTIMATED AVERAGE GLUCOSE      68 - 126 mg/dL 108       Legend:  (L) Low  (H) High  MRI brain 10/17/24  CONCLUSION:    1. No acute infarct, acute intracranial hemorrhage, or hydrocephalus.   2. There are trace punctate foci of T2 hyperintense signal within the periventricular and subcortical white matter.  This finding is nonspecific.  This could represent the sequelae of migraine headaches, trace chronic small vessel ischemic disease, or a   demyelinating process among many other etiologies.     Past Medical History:  Past Medical History:    Anxiety state, unspecified    Back pain    COVID-19    Depression    Displacement of lumbar intervertebral disc without myelopathy    Log Date: 08/06/2012 Fusion of L2-3     Internal hemorrhoids with complication    Migraine    Migraines        Past Surgical History:  Past Surgical History:   Procedure Laterality Date    Ankle fracture surgery      Excis lumbar disk,one level      fusion L2-3    Revise ulnar nerve at elbow  2009    ulnar release to both arms.     Spinal fusion  2014       Family History:  family history includes ADHD in his son; Diabetes in his paternal grandfather; No Known Problems in his father and  mother.      Social History:   reports that he has never smoked. He has never been exposed to tobacco smoke. He has never used smokeless tobacco. He reports current alcohol use. He reports that he does not currently use drugs after having used the following drugs: Cannabis.    Allergies:  Allergies[1]    MEDICATIONS:    Current Outpatient Medications:     butalbital-acetaminophen-caffeine -40 MG Oral Tab, Take at onset of migraine, can repeat in 4-6 hours. Do not take more than 2-3 times a week, Disp: 10 tablet, Rfl: 1    semaglutide-weight management 1.7 MG/0.75ML Subcutaneous Solution Auto-injector, Inject 0.75 mL (1.7 mg total) into the skin once a week., Disp: 3 mL, Rfl: 0    Propranolol HCl ER 80 MG Oral Capsule SR 24 Hr, Take 1 capsule (80 mg total) by mouth daily., Disp: 90 capsule, Rfl: 1    clonazePAM 1 MG Oral Tab, , Disp: , Rfl:     lamoTRIgine  MG Oral Tablet 24 Hr, Take by mouth daily. Takes with 100mg for total dose 400mg, Disp: , Rfl:     lamoTRIgine  MG Oral Tablet 24 Hr, Take 1 tablet (100 mg total) by mouth daily., Disp: , Rfl:     Amphetamine-Dextroamphet ER 20 MG Oral Capsule SR 24 Hr, Take 1 capsule (20 mg total) by mouth every morning., Disp: , Rfl:     JUBLIA 10 % External Solution, , Disp: , Rfl:     Ciclopirox Olamine 0.77 % External Suspension, , Disp: , Rfl:     meclizine 25 MG Oral Tab, Take 2 tablets (50 mg total) by mouth 3 (three) times daily as needed. (Patient not taking: Reported on 4/22/2025), Disp: 15 tablet, Rfl: 0      Review of Systems:   A comprehensive 10 point review of systems was completed.     Pertinent positives and negatives noted in the HPI.         PHYSICAL EXAM:   Neurological Exam  Vitals  Vitals:    04/22/25 0915   BP: 140/90   Pulse: 77   Resp: 16       General Appearance: Patient is a 44 year old male in no acute distress  Cardiac: Normal rate & regular rhythm  Skin: There are no rashes or other skin lesions.  Musculoskeletal: There is no  scoliosis, or joint deformities  Neurologic examination:  Mental status: Patient is alert, attentive, and oriented x 3. Language is coherent and fluent without aphasia. Memory, comprehension and ability to follow commands were intact.   Cranial nerves II-XII: Optic discs were sharp. Pupils were round and reacted to light. Extraocular movements were full. There was no face, jaw, palate or tongue weakness or atrophy. Facial sensation was normal. Hearing was grossly intact. Shoulder shrug was normal.   Motor exam revealed normal muscle bulk and tone. No atrophy or fasciculations. Manual muscle testing revealed MRC grade 5/5 strength throughout including proximal and distal muscles of the arms and legs.  Deep tendon reflexes were 2 at the biceps, brachioradialis, triceps, knee jerk, and ankle jerk. Plantar responses were flexor bilaterally.   Sensory exam revealed normal light touch perception. Vibratory perception and proprioception were intact at the toes. Pinprick and temperature were normal. Romberg sign was absent.  Complex motor skills revealed normal coordination. Finger-nose-finger intact.   Gait was narrow and stable, was able to walk on heels, toes and tandem without any difficulty.     ASSESSMENT/ACTIVE PROBLEM LIST:     Encounter Diagnoses   Name Primary?    Vestibular migraine Yes    Chronic migraine without aura without status migrainosus, not intractable     Abnormal computed tomography angiography (CTA)        Discussion/Plan:  Probable rare vestibular migraines- complex migraine in fall 2024, and rare episodes of vertigo- episode of lightheadedness with slowed thinking and slowed speech, and second episode of nonsensical speech and vertigo and diplopia, with migraine headache. MRI and CTA negative  Continue propranolol 80mg ER daily    Migraines without aura  Propranolol 80mg ER daily  Educated on triggers and lifestyle behaviors including limiting caffeine intake, not skipping meals, having regular  sleep schedule and practicing good sleep hygiene, avoid migraine food triggers/try to identify if any of them are triggers (nitrates, aged cheeses, red wine, chocolate, msg, artificial sweetener).   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 Do not take more than 2-3 times a week          We discussed in depth regarding the diagnosis, prognosis, treatment. The patient was given ample opportunity to ask questions. All questions and concerns were addressed.     Alanis Montero DO  Neuromuscular and General Neurology  [facility]             [1] No Known Allergies

## 2025-05-16 DIAGNOSIS — G43.709 CHRONIC MIGRAINE WITHOUT AURA WITHOUT STATUS MIGRAINOSUS, NOT INTRACTABLE: ICD-10-CM

## 2025-05-16 NOTE — TELEPHONE ENCOUNTER
Medication: Propranolol     Date of last refill: 10/22/24 for #90/1 additional refill  [place]    Last office visit: 4/22/25  Due back to clinic per last office note:  9 months  Date next office visit scheduled:  not yet scheduled    Last OV note recommendation: per Dr. Montero: \"Discussion/Plan:  Probable rare vestibular migraines- complex migraine in fall 2024, and rare episodes of vertigo- episode of lightheadedness with slowed thinking and slowed speech, and second episode of nonsensical speech and vertigo and diplopia, with migraine headache. MRI and CTA negative  Continue propranolol 80mg ER daily     Migraines without aura  Propranolol 80mg ER daily  Educated on triggers and lifestyle behaviors including limiting caffeine intake, not skipping meals, having regular sleep schedule and practicing good sleep hygiene, avoid migraine food triggers/try to identify if any of them are triggers (nitrates, aged cheeses, red wine, chocolate, msg, artificial sweetener).   Abortive has tried maxalt, imitrex  Switch abortive to fiorcet\"

## 2025-05-18 RX ORDER — PROPRANOLOL HYDROCHLORIDE 80 MG/1
1 CAPSULE, EXTENDED RELEASE ORAL DAILY
Qty: 90 CAPSULE | Refills: 2 | Status: SHIPPED | OUTPATIENT
Start: 2025-05-18

## 2025-05-20 DIAGNOSIS — E78.2 MIXED HYPERLIPIDEMIA: ICD-10-CM

## 2025-05-20 DIAGNOSIS — Z51.81 ENCOUNTER FOR THERAPEUTIC DRUG MONITORING: ICD-10-CM

## 2025-05-20 DIAGNOSIS — Z86.39 HISTORY OF MORBID OBESITY: ICD-10-CM

## 2025-05-20 DIAGNOSIS — E66.811 CLASS 1 OBESITY WITH SERIOUS COMORBIDITY AND BODY MASS INDEX (BMI) OF 34.0 TO 34.9 IN ADULT, UNSPECIFIED OBESITY TYPE: ICD-10-CM

## 2025-05-20 DIAGNOSIS — E88.819 INSULIN RESISTANCE: ICD-10-CM

## 2025-05-22 NOTE — TELEPHONE ENCOUNTER
Requesting wegovy 1.7 (increase?)  LOV: 4/16/25  RTC: 6 months  Filled: 4/16/25 #3ml with 0 refills    Future Appointments   Date Time Provider Department Center   7/28/2025  8:40 AM Giana Peña APRN [id number]   10/22/2025  9:00 AM  MR RM3 (3T WIDE)  [facility]     Medication Plan: Finish Zepbound 10 mg weekly and then 7 days after last dose switch to Wegovy 1.7 mg weekly - sent to [facility]. Send dentaZOOM message after 3rd pen in box so I can send in the next dose. Provide status and home scale weight.

## 2025-05-23 ENCOUNTER — PATIENT MESSAGE (OUTPATIENT)
Dept: INTERNAL MEDICINE CLINIC | Facility: CLINIC | Age: 45
End: 2025-05-23

## 2025-05-24 RX ORDER — SEMAGLUTIDE 2.4 MG/.75ML
2.4 INJECTION, SOLUTION SUBCUTANEOUS WEEKLY
Qty: 3 ML | Refills: 3 | Status: SHIPPED | OUTPATIENT
Start: 2025-05-24

## 2025-07-13 ENCOUNTER — HOSPITAL ENCOUNTER (EMERGENCY)
Facility: HOSPITAL | Age: 45
Discharge: HOME | End: 2025-07-13
Attending: EMERGENCY MEDICINE
Payer: COMMERCIAL

## 2025-07-13 ENCOUNTER — APPOINTMENT (OUTPATIENT)
Dept: CT IMAGING | Facility: HOSPITAL | Age: 45
End: 2025-07-13
Attending: EMERGENCY MEDICINE
Payer: COMMERCIAL

## 2025-07-13 ENCOUNTER — HOSPITAL ENCOUNTER (OUTPATIENT)
Age: 45
Discharge: ANOTHER HOSPITAL | End: 2025-07-13
Payer: COMMERCIAL

## 2025-07-13 VITALS
OXYGEN SATURATION: 99 % | RESPIRATION RATE: 18 BRPM | HEART RATE: 85 BPM | SYSTOLIC BLOOD PRESSURE: 123 MMHG | DIASTOLIC BLOOD PRESSURE: 83 MMHG | TEMPERATURE: 99 F

## 2025-07-13 VITALS
TEMPERATURE: 97 F | DIASTOLIC BLOOD PRESSURE: 75 MMHG | BODY MASS INDEX: 34 KG/M2 | OXYGEN SATURATION: 97 % | WEIGHT: 268 LBS | HEART RATE: 69 BPM | SYSTOLIC BLOOD PRESSURE: 107 MMHG | RESPIRATION RATE: 14 BRPM

## 2025-07-13 DIAGNOSIS — S19.9XXA INJURY OF NECK, INITIAL ENCOUNTER: ICD-10-CM

## 2025-07-13 DIAGNOSIS — M54.2 NECK PAIN: Primary | ICD-10-CM

## 2025-07-13 DIAGNOSIS — M54.2 NECK PAIN ON LEFT SIDE: Primary | ICD-10-CM

## 2025-07-13 LAB
ALBUMIN SERPL-MCNC: 4.6 G/DL (ref 3.2–4.8)
ALBUMIN/GLOB SERPL: 1.6 {RATIO} (ref 1–2)
ALP LIVER SERPL-CCNC: 110 U/L (ref 45–117)
ALT SERPL-CCNC: 38 U/L (ref 10–49)
ANION GAP SERPL CALC-SCNC: 9 MMOL/L (ref 0–18)
AST SERPL-CCNC: 21 U/L (ref ?–34)
BASOPHILS # BLD AUTO: 0.02 X10(3) UL (ref 0–0.2)
BASOPHILS NFR BLD AUTO: 0.3 %
BILIRUB SERPL-MCNC: 0.5 MG/DL (ref 0.3–1.2)
BUN BLD-MCNC: 15 MG/DL (ref 9–23)
CALCIUM BLD-MCNC: 9.1 MG/DL (ref 8.7–10.6)
CHLORIDE SERPL-SCNC: 109 MMOL/L (ref 98–112)
CO2 SERPL-SCNC: 26 MMOL/L (ref 21–32)
CREAT BLD-MCNC: 1.15 MG/DL (ref 0.7–1.3)
EGFRCR SERPLBLD CKD-EPI 2021: 80 ML/MIN/1.73M2 (ref 60–?)
EOSINOPHIL # BLD AUTO: 0.05 X10(3) UL (ref 0–0.7)
EOSINOPHIL NFR BLD AUTO: 0.6 %
ERYTHROCYTE [DISTWIDTH] IN BLOOD BY AUTOMATED COUNT: 12.6 %
GLOBULIN PLAS-MCNC: 2.8 G/DL (ref 2–3.5)
GLUCOSE BLD-MCNC: 92 MG/DL (ref 70–99)
HCT VFR BLD AUTO: 45 % (ref 39–53)
HGB BLD-MCNC: 15.4 G/DL (ref 13–17.5)
IMM GRANULOCYTES # BLD AUTO: 0.02 X10(3) UL (ref 0–1)
IMM GRANULOCYTES NFR BLD: 0.3 %
LYMPHOCYTES # BLD AUTO: 1.67 X10(3) UL (ref 1–4)
LYMPHOCYTES NFR BLD AUTO: 21.6 %
MCH RBC QN AUTO: 29.4 PG (ref 26–34)
MCHC RBC AUTO-ENTMCNC: 34.2 G/DL (ref 31–37)
MCV RBC AUTO: 85.9 FL (ref 80–100)
MONOCYTES # BLD AUTO: 0.71 X10(3) UL (ref 0.1–1)
MONOCYTES NFR BLD AUTO: 9.2 %
NEUTROPHILS # BLD AUTO: 5.27 X10 (3) UL (ref 1.5–7.7)
NEUTROPHILS # BLD AUTO: 5.27 X10(3) UL (ref 1.5–7.7)
NEUTROPHILS NFR BLD AUTO: 68 %
OSMOLALITY SERPL CALC.SUM OF ELEC: 298 MOSM/KG (ref 275–295)
PLATELET # BLD AUTO: 255 10(3)UL (ref 150–450)
POTASSIUM SERPL-SCNC: 4.2 MMOL/L (ref 3.5–5.1)
PROT SERPL-MCNC: 7.4 G/DL (ref 5.7–8.2)
RBC # BLD AUTO: 5.24 X10(6)UL (ref 4.3–5.7)
SODIUM SERPL-SCNC: 144 MMOL/L (ref 136–145)
WBC # BLD AUTO: 7.7 X10(3) UL (ref 4–11)

## 2025-07-13 PROCEDURE — 99285 EMERGENCY DEPT VISIT HI MDM: CPT

## 2025-07-13 PROCEDURE — 70498 CT ANGIOGRAPHY NECK: CPT | Performed by: EMERGENCY MEDICINE

## 2025-07-13 PROCEDURE — 93005 ELECTROCARDIOGRAM TRACING: CPT

## 2025-07-13 PROCEDURE — 96361 HYDRATE IV INFUSION ADD-ON: CPT

## 2025-07-13 PROCEDURE — 80053 COMPREHEN METABOLIC PANEL: CPT | Performed by: EMERGENCY MEDICINE

## 2025-07-13 PROCEDURE — 93000 ELECTROCARDIOGRAM COMPLETE: CPT | Performed by: PHYSICIAN ASSISTANT

## 2025-07-13 PROCEDURE — 96374 THER/PROPH/DIAG INJ IV PUSH: CPT

## 2025-07-13 PROCEDURE — 93010 ELECTROCARDIOGRAM REPORT: CPT

## 2025-07-13 PROCEDURE — 96375 TX/PRO/DX INJ NEW DRUG ADDON: CPT

## 2025-07-13 PROCEDURE — 99215 OFFICE O/P EST HI 40 MIN: CPT | Performed by: PHYSICIAN ASSISTANT

## 2025-07-13 PROCEDURE — 70496 CT ANGIOGRAPHY HEAD: CPT | Performed by: EMERGENCY MEDICINE

## 2025-07-13 PROCEDURE — 85025 COMPLETE CBC W/AUTO DIFF WBC: CPT | Performed by: EMERGENCY MEDICINE

## 2025-07-13 PROCEDURE — 72125 CT NECK SPINE W/O DYE: CPT | Performed by: EMERGENCY MEDICINE

## 2025-07-13 RX ORDER — ONDANSETRON 2 MG/ML
4 INJECTION INTRAMUSCULAR; INTRAVENOUS ONCE
Status: COMPLETED | OUTPATIENT
Start: 2025-07-13 | End: 2025-07-13

## 2025-07-13 RX ORDER — ONDANSETRON 4 MG/1
4 TABLET, ORALLY DISINTEGRATING ORAL EVERY 4 HOURS PRN
Qty: 10 TABLET | Refills: 0 | Status: SHIPPED | OUTPATIENT
Start: 2025-07-13

## 2025-07-13 RX ORDER — HYDROCODONE BITARTRATE AND ACETAMINOPHEN 5; 325 MG/1; MG/1
1 TABLET ORAL EVERY 6 HOURS PRN
Qty: 10 TABLET | Refills: 0 | Status: SHIPPED | OUTPATIENT
Start: 2025-07-13

## 2025-07-13 RX ORDER — SODIUM CHLORIDE 9 MG/ML
125 INJECTION, SOLUTION INTRAVENOUS CONTINUOUS
Status: DISCONTINUED | OUTPATIENT
Start: 2025-07-13 | End: 2025-07-13

## 2025-07-13 RX ORDER — PREDNISONE 20 MG/1
60 TABLET ORAL ONCE
Status: COMPLETED | OUTPATIENT
Start: 2025-07-13 | End: 2025-07-13

## 2025-07-13 RX ORDER — MORPHINE SULFATE 4 MG/ML
4 INJECTION, SOLUTION INTRAMUSCULAR; INTRAVENOUS ONCE
Status: COMPLETED | OUTPATIENT
Start: 2025-07-13 | End: 2025-07-13

## 2025-07-13 RX ORDER — DIAZEPAM 2 MG/1
2 TABLET ORAL 3 TIMES DAILY PRN
Qty: 9 TABLET | Refills: 0 | Status: SHIPPED | OUTPATIENT
Start: 2025-07-13 | End: 2025-07-22

## 2025-07-13 RX ORDER — HYDROCODONE BITARTRATE AND ACETAMINOPHEN 5; 325 MG/1; MG/1
1 TABLET ORAL EVERY 6 HOURS PRN
Qty: 10 TABLET | Refills: 0 | Status: SHIPPED | OUTPATIENT
Start: 2025-07-13 | End: 2025-07-23

## 2025-07-13 RX ORDER — DIAZEPAM 2 MG/1
2 TABLET ORAL 3 TIMES DAILY PRN
Qty: 9 TABLET | Refills: 0 | Status: SHIPPED | OUTPATIENT
Start: 2025-07-13

## 2025-07-13 RX ORDER — ONDANSETRON 4 MG/1
4 TABLET, ORALLY DISINTEGRATING ORAL EVERY 4 HOURS PRN
Qty: 10 TABLET | Refills: 0 | Status: SHIPPED | OUTPATIENT
Start: 2025-07-13 | End: 2025-07-20
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
## 2025-07-13 NOTE — DISCHARGE INSTRUCTIONS
Ibuprofen 400 mg 3 times a day with food for 3 to 5 days  Valium 2 mg up to 3 times daily as needed for muscle spasm, do not drive or drink alcohol and Valium  Norco for severe pain 1 tablet every 6 hours as needed.  Do not drive or drink alcohol on Valium.  He may cause constipation take Colace stool softener.  Warm compresses topically 20 minutes every 2 hours while awake  May try lidocaine patches topically which you can buy over-the-counter as needed  Return if worse  Follow-up with primary care this week and make an appointment with ENT
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
## 2025-07-14 LAB
ATRIAL RATE: 79 BPM
ATRIAL RATE: 80 BPM
P AXIS: -5 DEGREES
P AXIS: 4 DEGREES
P-R INTERVAL: 166 MS
P-R INTERVAL: 172 MS
Q-T INTERVAL: 386 MS
Q-T INTERVAL: 390 MS
QRS DURATION: 96 MS
QRS DURATION: 98 MS
QTC CALCULATION (BEZET): 445 MS
QTC CALCULATION (BEZET): 447 MS
R AXIS: 0 DEGREES
R AXIS: 0 DEGREES
T AXIS: 13 DEGREES
T AXIS: 21 DEGREES
VENTRICULAR RATE: 79 BPM
VENTRICULAR RATE: 80 BPM

## 2025-07-17 ENCOUNTER — HOSPITAL ENCOUNTER (OUTPATIENT)
Age: 45
Discharge: HOME | End: 2025-07-17
Payer: COMMERCIAL

## 2025-07-17 VITALS
HEART RATE: 85 BPM | OXYGEN SATURATION: 97 % | DIASTOLIC BLOOD PRESSURE: 89 MMHG | RESPIRATION RATE: 18 BRPM | SYSTOLIC BLOOD PRESSURE: 140 MMHG | TEMPERATURE: 98 F

## 2025-07-17 DIAGNOSIS — U07.1 COVID-19 VIRUS INFECTION: Primary | ICD-10-CM

## 2025-07-17 LAB
POCT INFLUENZA A: NEGATIVE
POCT INFLUENZA B: NEGATIVE
SARS-COV-2 RNA RESP QL NAA+PROBE: DETECTED

## 2025-07-17 PROCEDURE — U0002 COVID-19 LAB TEST NON-CDC: HCPCS | Performed by: NURSE PRACTITIONER

## 2025-07-17 PROCEDURE — 99214 OFFICE O/P EST MOD 30 MIN: CPT | Performed by: NURSE PRACTITIONER

## 2025-07-17 PROCEDURE — 87502 INFLUENZA DNA AMP PROBE: CPT | Performed by: NURSE PRACTITIONER

## 2025-07-17 RX ORDER — CODEINE PHOSPHATE AND GUAIFENESIN 10; 100 MG/5ML; MG/5ML
5 SOLUTION ORAL EVERY 6 HOURS PRN
Qty: 70 ML | Refills: 0 | Status: SHIPPED | OUTPATIENT
Start: 2025-07-17 | End: 2025-07-24

## 2025-07-17 RX ORDER — NIRMATRELVIR AND RITONAVIR 300-100 MG
KIT ORAL
Qty: 30 TABLET | Refills: 0 | Status: SHIPPED | OUTPATIENT
Start: 2025-07-17 | End: 2025-07-22
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
## 2025-07-17 NOTE — DISCHARGE INSTRUCTIONS
Take Paxlovid as directed if you decide to take it.  Tylenol and Motrin as needed for pain.  Mucinex for congestion and cough.  You may also use the cough syrup.  If you use the cough syrup do not drive for 6 to 8 hours and use a stool softener such as Colace.  Do not take the cough syrup with the Valium or Norco that was prescribed to you from your ER visit.

## 2025-08-06 ENCOUNTER — HOSPITAL ENCOUNTER (EMERGENCY)
Facility: HOSPITAL | Age: 45
Discharge: HOME | End: 2025-08-07
Attending: EMERGENCY MEDICINE

## 2025-08-06 ENCOUNTER — APPOINTMENT (OUTPATIENT)
Dept: CT IMAGING | Facility: HOSPITAL | Age: 45
End: 2025-08-06
Attending: EMERGENCY MEDICINE

## 2025-08-06 VITALS
RESPIRATION RATE: 18 BRPM | HEART RATE: 75 BPM | OXYGEN SATURATION: 99 % | TEMPERATURE: 99 F | SYSTOLIC BLOOD PRESSURE: 134 MMHG | DIASTOLIC BLOOD PRESSURE: 94 MMHG

## 2025-08-06 DIAGNOSIS — S10.93XA CONTUSION OF NECK, INITIAL ENCOUNTER: Primary | ICD-10-CM

## 2025-08-06 DIAGNOSIS — M79.2 NEURALGIA: ICD-10-CM

## 2025-08-06 LAB
ALBUMIN SERPL-MCNC: 4.7 G/DL (ref 3.2–4.8)
ALBUMIN/GLOB SERPL: 1.7 (ref 1–2)
ALP LIVER SERPL-CCNC: 110 U/L (ref 45–117)
ALT SERPL-CCNC: 36 U/L (ref 10–49)
ANION GAP SERPL CALC-SCNC: 9 MMOL/L (ref 0–18)
AST SERPL-CCNC: 20 U/L (ref ?–34)
BASOPHILS # BLD AUTO: 0.03 X10(3) UL (ref 0–0.2)
BASOPHILS NFR BLD AUTO: 0.5 %
BILIRUB SERPL-MCNC: 0.4 MG/DL (ref 0.3–1.2)
BUN BLD-MCNC: 14 MG/DL (ref 9–23)
CALCIUM BLD-MCNC: 9.4 MG/DL (ref 8.7–10.6)
CHLORIDE SERPL-SCNC: 105 MMOL/L (ref 98–112)
CO2 SERPL-SCNC: 26 MMOL/L (ref 21–32)
CREAT BLD-MCNC: 1.05 MG/DL (ref 0.7–1.3)
EGFRCR SERPLBLD CKD-EPI 2021: 90 ML/MIN/1.73M2 (ref 60–?)
EOSINOPHIL # BLD AUTO: 0.09 X10(3) UL (ref 0–0.7)
EOSINOPHIL NFR BLD AUTO: 1.6 %
ERYTHROCYTE [DISTWIDTH] IN BLOOD BY AUTOMATED COUNT: 12.6 %
GLOBULIN PLAS-MCNC: 2.8 G/DL (ref 2–3.5)
GLUCOSE BLD-MCNC: 93 MG/DL (ref 70–99)
HCT VFR BLD AUTO: 46.2 % (ref 39–53)
HGB BLD-MCNC: 15.3 G/DL (ref 13–17.5)
IMM GRANULOCYTES # BLD AUTO: 0.02 X10(3) UL (ref 0–1)
IMM GRANULOCYTES NFR BLD: 0.4 %
LYMPHOCYTES # BLD AUTO: 2.32 X10(3) UL (ref 1–4)
LYMPHOCYTES NFR BLD AUTO: 42 %
MCH RBC QN AUTO: 28.5 PG (ref 26–34)
MCHC RBC AUTO-ENTMCNC: 33.1 G/DL (ref 31–37)
MCV RBC AUTO: 86 FL (ref 80–100)
MONOCYTES # BLD AUTO: 0.42 X10(3) UL (ref 0.1–1)
MONOCYTES NFR BLD AUTO: 7.6 %
NEUTROPHILS # BLD AUTO: 2.64 X10 (3) UL (ref 1.5–7.7)
NEUTROPHILS # BLD AUTO: 2.64 X10(3) UL (ref 1.5–7.7)
NEUTROPHILS NFR BLD AUTO: 47.9 %
OSMOLALITY SERPL CALC.SUM OF ELEC: 290 MOSM/KG (ref 275–295)
PLATELET # BLD AUTO: 275 10(3)UL (ref 150–450)
POTASSIUM SERPL-SCNC: 4.1 MMOL/L (ref 3.5–5.1)
PROT SERPL-MCNC: 7.5 G/DL (ref 5.7–8.2)
RBC # BLD AUTO: 5.37 X10(6)UL (ref 4.3–5.7)
SODIUM SERPL-SCNC: 140 MMOL/L (ref 136–145)
WBC # BLD AUTO: 5.5 X10(3) UL (ref 4–11)

## 2025-08-06 PROCEDURE — 85025 COMPLETE CBC W/AUTO DIFF WBC: CPT | Performed by: EMERGENCY MEDICINE

## 2025-08-06 PROCEDURE — 80053 COMPREHEN METABOLIC PANEL: CPT | Performed by: EMERGENCY MEDICINE

## 2025-08-06 PROCEDURE — 96372 THER/PROPH/DIAG INJ SC/IM: CPT

## 2025-08-06 PROCEDURE — 99285 EMERGENCY DEPT VISIT HI MDM: CPT

## 2025-08-06 PROCEDURE — 99284 EMERGENCY DEPT VISIT MOD MDM: CPT

## 2025-08-06 PROCEDURE — 96361 HYDRATE IV INFUSION ADD-ON: CPT

## 2025-08-06 PROCEDURE — 70498 CT ANGIOGRAPHY NECK: CPT | Performed by: EMERGENCY MEDICINE

## 2025-08-06 PROCEDURE — 96374 THER/PROPH/DIAG INJ IV PUSH: CPT

## 2025-08-06 PROCEDURE — 96375 TX/PRO/DX INJ NEW DRUG ADDON: CPT

## 2025-08-06 PROCEDURE — 72125 CT NECK SPINE W/O DYE: CPT | Performed by: EMERGENCY MEDICINE

## 2025-08-06 RX ORDER — CYCLOBENZAPRINE HCL 10 MG
10 TABLET ORAL 3 TIMES DAILY PRN
Qty: 20 TABLET | Refills: 0 | Status: SHIPPED | OUTPATIENT
Start: 2025-08-06 | End: 2025-08-13

## 2025-08-06 RX ORDER — TRAMADOL HYDROCHLORIDE 50 MG/1
TABLET ORAL EVERY 6 HOURS PRN
Qty: 10 TABLET | Refills: 0 | Status: SHIPPED | OUTPATIENT
Start: 2025-08-06 | End: 2025-08-11

## 2025-08-06 RX ORDER — HYDROMORPHONE HYDROCHLORIDE 1 MG/ML
0.5 INJECTION, SOLUTION INTRAMUSCULAR; INTRAVENOUS; SUBCUTANEOUS ONCE
Refills: 0 | Status: COMPLETED | OUTPATIENT
Start: 2025-08-06 | End: 2025-08-06

## 2025-08-06 RX ORDER — KETOROLAC TROMETHAMINE 30 MG/ML
60 INJECTION, SOLUTION INTRAMUSCULAR; INTRAVENOUS ONCE
Status: COMPLETED | OUTPATIENT
Start: 2025-08-06 | End: 2025-08-06

## 2025-08-06 RX ORDER — CYCLOBENZAPRINE HCL 10 MG
10 TABLET ORAL 3 TIMES DAILY PRN
Status: DISCONTINUED | OUTPATIENT
Start: 2025-08-06 | End: 2025-08-07

## 2025-08-06 RX ORDER — METHYLPREDNISOLONE 4 MG/1
TABLET ORAL
Qty: 1 EACH | Refills: 0 | Status: SHIPPED | OUTPATIENT
Start: 2025-08-06

## 2025-08-06 RX ORDER — DEXAMETHASONE SODIUM PHOSPHATE 10 MG/ML
10 INJECTION, SOLUTION INTRAMUSCULAR; INTRAVENOUS ONCE
Status: COMPLETED | OUTPATIENT
Start: 2025-08-06 | End: 2025-08-06

## 2025-08-26 DIAGNOSIS — Z51.81 ENCOUNTER FOR THERAPEUTIC DRUG MONITORING: ICD-10-CM

## 2025-08-26 DIAGNOSIS — E66.811 CLASS 1 OBESITY WITH SERIOUS COMORBIDITY AND BODY MASS INDEX (BMI) OF 34.0 TO 34.9 IN ADULT, UNSPECIFIED OBESITY TYPE: ICD-10-CM

## 2025-08-26 DIAGNOSIS — E78.2 MIXED HYPERLIPIDEMIA: ICD-10-CM

## 2025-08-26 DIAGNOSIS — Z86.39 HISTORY OF MORBID OBESITY: ICD-10-CM

## 2025-08-26 DIAGNOSIS — E88.819 INSULIN RESISTANCE: ICD-10-CM

## 2025-08-29 RX ORDER — SEMAGLUTIDE 2.4 MG/.75ML
2.4 INJECTION, SOLUTION SUBCUTANEOUS WEEKLY
Qty: 3 ML | Refills: 2 | Status: SHIPPED | OUTPATIENT
Start: 2025-08-29

## (undated) NOTE — LETTER
20    Patient: Soraida Lemons  : 1980 Visit date: 2020    Dear  Saurabh Keller DO    Thank you for referring Soraida Lemons to my practice. Please find my assessment and plan below.        Assessment   Rectal bleeding  (primary enc

## (undated) NOTE — LETTER
20    Patient: Tea Chung  : 1980 Visit date: 2020    Dear  Jessica King DO    Thank you for referring Tea Chung to my practice. Please find my assessment and plan below.         Assessment   Internal hemorrhoids with

## (undated) NOTE — LETTER
10/09/20    Patient: Katty Goddard  : 1980 Visit date: 10/9/2020    Dear  Taran Patterson DO    Thank you for referring Katty Goddard to my practice. Please find my assessment and plan below.         Assessment   Internal hemorrhoids with

## (undated) NOTE — LETTER
20    Patient: Senait Silverman  : 1980 Visit date: 2020    Dear  Ernesto Da Silva, DO    Thank you for referring Senait Silverman to my practice. Please find my assessment and plan below.         Assessment   Internal hemorrhoids with c

## (undated) NOTE — ED AVS SNAPSHOT
Tea Sheldon   MRN: [id number]    Department:  [address] in [place]   Date of Visit:  1/6/2020           Disclosure     Insurance plans vary and the physician(s) referred by the ER may not be covered by your plan.  Please conta tell this physician (or your personal doctor if your instructions are to return to your personal doctor) about any new or lasting problems. The primary care or specialist physician will see patients referred from the [facility] Emergency Department.  Kody Aranda